# Patient Record
Sex: FEMALE | Race: WHITE | NOT HISPANIC OR LATINO | Employment: FULL TIME | URBAN - METROPOLITAN AREA
[De-identification: names, ages, dates, MRNs, and addresses within clinical notes are randomized per-mention and may not be internally consistent; named-entity substitution may affect disease eponyms.]

---
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
## 2018-11-09 ENCOUNTER — HOSPITAL ENCOUNTER (OUTPATIENT)
Dept: LAB | Facility: MEDICAL CENTER | Age: 18
End: 2018-11-09
Attending: FAMILY MEDICINE
Payer: COMMERCIAL

## 2018-11-09 LAB
ALBUMIN SERPL BCP-MCNC: 4.7 G/DL (ref 3.2–4.9)
ALBUMIN/GLOB SERPL: 2 G/DL
ALP SERPL-CCNC: 68 U/L (ref 30–99)
ALT SERPL-CCNC: 17 U/L (ref 2–50)
AMYLASE SERPL-CCNC: 52 U/L (ref 20–103)
ANION GAP SERPL CALC-SCNC: 6 MMOL/L (ref 0–11.9)
APPEARANCE UR: CLEAR
AST SERPL-CCNC: 9 U/L (ref 12–45)
BACTERIA #/AREA URNS HPF: ABNORMAL /HPF
BASOPHILS # BLD AUTO: 0.7 % (ref 0–1.8)
BASOPHILS # BLD: 0.03 K/UL (ref 0–0.12)
BILIRUB SERPL-MCNC: 0.5 MG/DL (ref 0.1–1.5)
BILIRUB UR QL STRIP.AUTO: NEGATIVE
BUN SERPL-MCNC: 8 MG/DL (ref 8–22)
CALCIUM SERPL-MCNC: 9.7 MG/DL (ref 8.5–10.5)
CHLORIDE SERPL-SCNC: 106 MMOL/L (ref 96–112)
CO2 SERPL-SCNC: 26 MMOL/L (ref 20–33)
COLOR UR: YELLOW
CREAT SERPL-MCNC: 0.86 MG/DL (ref 0.5–1.4)
EOSINOPHIL # BLD AUTO: 0.07 K/UL (ref 0–0.51)
EOSINOPHIL NFR BLD: 1.6 % (ref 0–6.9)
EPI CELLS #/AREA URNS HPF: NEGATIVE /HPF
ERYTHROCYTE [DISTWIDTH] IN BLOOD BY AUTOMATED COUNT: 39.9 FL (ref 35.9–50)
FASTING STATUS PATIENT QL REPORTED: NORMAL
GLOBULIN SER CALC-MCNC: 2.4 G/DL (ref 1.9–3.5)
GLUCOSE SERPL-MCNC: 89 MG/DL (ref 65–99)
GLUCOSE UR STRIP.AUTO-MCNC: NEGATIVE MG/DL
HCT VFR BLD AUTO: 46.2 % (ref 37–47)
HGB BLD-MCNC: 15.3 G/DL (ref 12–16)
HYALINE CASTS #/AREA URNS LPF: ABNORMAL /LPF
IMM GRANULOCYTES # BLD AUTO: 0.01 K/UL (ref 0–0.11)
IMM GRANULOCYTES NFR BLD AUTO: 0.2 % (ref 0–0.9)
KETONES UR STRIP.AUTO-MCNC: NEGATIVE MG/DL
LEUKOCYTE ESTERASE UR QL STRIP.AUTO: ABNORMAL
LIPASE SERPL-CCNC: 16 U/L (ref 11–82)
LYMPHOCYTES # BLD AUTO: 1.44 K/UL (ref 1–4.8)
LYMPHOCYTES NFR BLD: 33.3 % (ref 22–41)
MCH RBC QN AUTO: 30.1 PG (ref 27–33)
MCHC RBC AUTO-ENTMCNC: 33.1 G/DL (ref 33.6–35)
MCV RBC AUTO: 90.9 FL (ref 81.4–97.8)
MICRO URNS: ABNORMAL
MONOCYTES # BLD AUTO: 0.39 K/UL (ref 0–0.85)
MONOCYTES NFR BLD AUTO: 9 % (ref 0–13.4)
NEUTROPHILS # BLD AUTO: 2.38 K/UL (ref 2–7.15)
NEUTROPHILS NFR BLD: 55.2 % (ref 44–72)
NITRITE UR QL STRIP.AUTO: NEGATIVE
NRBC # BLD AUTO: 0 K/UL
NRBC BLD-RTO: 0 /100 WBC
PH UR STRIP.AUTO: 7 [PH]
PLATELET # BLD AUTO: 172 K/UL (ref 164–446)
PMV BLD AUTO: 13 FL (ref 9–12.9)
POTASSIUM SERPL-SCNC: 3.8 MMOL/L (ref 3.6–5.5)
PROT SERPL-MCNC: 7.1 G/DL (ref 6–8.2)
PROT UR QL STRIP: NEGATIVE MG/DL
RBC # BLD AUTO: 5.08 M/UL (ref 4.2–5.4)
RBC # URNS HPF: ABNORMAL /HPF
RBC UR QL AUTO: NEGATIVE
SODIUM SERPL-SCNC: 138 MMOL/L (ref 135–145)
SP GR UR STRIP.AUTO: 1.01
UROBILINOGEN UR STRIP.AUTO-MCNC: 0.2 MG/DL
WBC # BLD AUTO: 4.3 K/UL (ref 4.8–10.8)
WBC #/AREA URNS HPF: ABNORMAL /HPF

## 2018-11-09 PROCEDURE — 36415 COLL VENOUS BLD VENIPUNCTURE: CPT

## 2018-11-09 PROCEDURE — 81001 URINALYSIS AUTO W/SCOPE: CPT

## 2018-11-09 PROCEDURE — 80053 COMPREHEN METABOLIC PANEL: CPT

## 2018-11-09 PROCEDURE — 85025 COMPLETE CBC W/AUTO DIFF WBC: CPT

## 2018-11-09 PROCEDURE — 83690 ASSAY OF LIPASE: CPT

## 2018-11-09 PROCEDURE — 82150 ASSAY OF AMYLASE: CPT

## 2019-03-07 ENCOUNTER — OFFICE VISIT (OUTPATIENT)
Dept: URGENT CARE | Facility: PHYSICIAN GROUP | Age: 19
End: 2019-03-07
Payer: COMMERCIAL

## 2019-03-07 VITALS
HEART RATE: 80 BPM | BODY MASS INDEX: 26.11 KG/M2 | TEMPERATURE: 99 F | HEIGHT: 60 IN | DIASTOLIC BLOOD PRESSURE: 62 MMHG | RESPIRATION RATE: 18 BRPM | OXYGEN SATURATION: 97 % | SYSTOLIC BLOOD PRESSURE: 112 MMHG | WEIGHT: 133 LBS

## 2019-03-07 DIAGNOSIS — H61.21 IMPACTED CERUMEN OF RIGHT EAR: ICD-10-CM

## 2019-03-07 DIAGNOSIS — H66.001 ACUTE SUPPURATIVE OTITIS MEDIA OF RIGHT EAR WITHOUT SPONTANEOUS RUPTURE OF TYMPANIC MEMBRANE, RECURRENCE NOT SPECIFIED: ICD-10-CM

## 2019-03-07 PROCEDURE — 69210 REMOVE IMPACTED EAR WAX UNI: CPT | Performed by: PHYSICIAN ASSISTANT

## 2019-03-07 PROCEDURE — 99214 OFFICE O/P EST MOD 30 MIN: CPT | Mod: 25 | Performed by: PHYSICIAN ASSISTANT

## 2019-03-07 RX ORDER — AMOXICILLIN 875 MG/1
875 TABLET, COATED ORAL 2 TIMES DAILY
Qty: 20 TAB | Refills: 0 | Status: SHIPPED | OUTPATIENT
Start: 2019-03-07 | End: 2019-03-17

## 2019-03-10 ASSESSMENT — ENCOUNTER SYMPTOMS
COUGH: 0
CHILLS: 0
HEADACHES: 0
EYE REDNESS: 0
EYE DISCHARGE: 0
VOMITING: 0
RHINORRHEA: 0
FEVER: 0
DIARRHEA: 0

## 2019-03-10 NOTE — PROGRESS NOTES
Subjective:      Homa Rodriguez is a 18 y.o. female who presents with Otalgia (right ear yesterday )            Patient is a 18-year-old female who presents to urgent care with right ear pain since yesterday.  Patient reports history of ear infections in the past and is uncertain if she has one.  Reports that she does have decreased hearing however has had issues with wax in the past as well.      Otalgia    There is pain in the right ear. This is a new problem. The current episode started yesterday. The problem occurs constantly. Associated symptoms include hearing loss. Pertinent negatives include no coughing, diarrhea, ear discharge, headaches, rash, rhinorrhea or vomiting. She has tried nothing for the symptoms.       Review of Systems   Constitutional: Negative for chills and fever.   HENT: Positive for ear pain and hearing loss. Negative for ear discharge and rhinorrhea.    Eyes: Negative for discharge and redness.   Respiratory: Negative for cough.    Gastrointestinal: Negative for diarrhea and vomiting.   Skin: Negative for rash.   Neurological: Negative for headaches.   All other systems reviewed and are negative.         Objective:     /62   Pulse 80   Temp 37.2 °C (99 °F) (Temporal)   Resp 18   Ht 1.524 m (5')   Wt 60.3 kg (133 lb)   SpO2 97%   BMI 25.97 kg/m²    PMH:  has no past medical history on file.  MEDS:   Current Outpatient Prescriptions:   •  amoxicillin (AMOXIL) 875 MG tablet, Take 1 Tab by mouth 2 times a day for 10 days., Disp: 20 Tab, Rfl: 0  •  nitrofurantoin monohydr macro (MACROBID) 100 MG Cap, Take 1 Cap by mouth 2 times a day. (Patient not taking: Reported on 3/7/2019), Disp: 20 Cap, Rfl: 0  •  amoxicillin (AMOXIL) 500 MG Cap, Take 1 Cap by mouth 3 times a day. (Patient not taking: Reported on 3/7/2019), Disp: 30 Cap, Rfl: 0  •  Probiotic Product (PROBIOTIC DAILY PO), Take  by mouth., Disp: , Rfl:   •  cephALEXin (KEFLEX) 500 MG Cap, Take 1 Cap by mouth 2 times a  day. (Patient not taking: Reported on 3/7/2019), Disp: 20 Cap, Rfl: 0  •  Multiple Vitamin (DAILY VITAMIN PO), Take  by mouth., Disp: , Rfl:   ALLERGIES: No Known Allergies  SURGHX: No past surgical history on file.  SOCHX:  reports that she has never smoked. She has never used smokeless tobacco.  FH: Family history was reviewed, no pertinent findings to report      Physical Exam   Constitutional: She is oriented to person, place, and time. She appears well-developed and well-nourished. No distress.   HENT:   Head: Normocephalic and atraumatic.   Left Ear: External ear normal.   Mouth/Throat: Oropharynx is clear and moist. No oropharyngeal exudate.   Right canal with notable cerumen impaction-this was removed by myself today with curette.  Canal and TM with slight erythema without bulging -with slight clear middle ear effusion.   Eyes: Pupils are equal, round, and reactive to light. Conjunctivae and EOM are normal.   Neck: Normal range of motion. Neck supple. No tracheal deviation present.   Cardiovascular: Normal rate and regular rhythm.    No murmur heard.  Pulmonary/Chest: Effort normal and breath sounds normal. No respiratory distress.   Musculoskeletal: Normal range of motion. She exhibits no edema.   Neurological: She is alert and oriented to person, place, and time. Coordination normal.   Skin: Skin is warm. No rash noted.   Psychiatric: She has a normal mood and affect. Her behavior is normal. Judgment and thought content normal.   Vitals reviewed.              Assessment/Plan:     1. Impacted cerumen of right ear  2. Acute suppurative otitis media of right ear without spontaneous rupture of tympanic membrane, recurrence not specified  - amoxicillin (AMOXIL) 875 MG tablet; Take 1 Tab by mouth 2 times a day for 10 days.  Dispense: 20 Tab; Refill: 0    TM and canal with slight erythema-contingent antibiotic was written for this patient to start based on guidelines discussed during the visit today.  Avoid the  use of Q-tips in the future.  Return to clinic as needed.  Patient given precautionary s/sx that mandate immediate follow up and evaluation in the ED. Advised of risks of not doing so.    DDX, Supportive care, and indications for immediate follow-up discussed with patient.    Instructed to return to clinic or nearest emergency department if we are not available for any change in condition, further concerns, or worsening of symptoms.    The patient demonstrated a good understanding and agreed with the treatment plan.  Please note that this dictation was created using voice recognition software. I have made every reasonable attempt to correct obvious errors, but I expect that there are errors of grammar and possibly content that I did not discover before finalizing the note.

## 2019-07-24 ENCOUNTER — HOSPITAL ENCOUNTER (OUTPATIENT)
Dept: RADIOLOGY | Facility: MEDICAL CENTER | Age: 19
End: 2019-07-24
Attending: PHYSICIAN ASSISTANT
Payer: COMMERCIAL

## 2019-07-24 ENCOUNTER — OFFICE VISIT (OUTPATIENT)
Dept: URGENT CARE | Facility: PHYSICIAN GROUP | Age: 19
End: 2019-07-24
Payer: COMMERCIAL

## 2019-07-24 VITALS
HEART RATE: 87 BPM | TEMPERATURE: 97.7 F | HEIGHT: 60 IN | SYSTOLIC BLOOD PRESSURE: 122 MMHG | BODY MASS INDEX: 27.96 KG/M2 | DIASTOLIC BLOOD PRESSURE: 70 MMHG | OXYGEN SATURATION: 97 % | WEIGHT: 142.4 LBS

## 2019-07-24 DIAGNOSIS — R10.9 LEFT SIDED ABDOMINAL PAIN: ICD-10-CM

## 2019-07-24 LAB
APPEARANCE UR: NORMAL
BILIRUB UR STRIP-MCNC: NEGATIVE MG/DL
COLOR UR AUTO: NORMAL
GLUCOSE UR STRIP.AUTO-MCNC: NEGATIVE MG/DL
INT CON NEG: NEGATIVE
INT CON POS: POSITIVE
KETONES UR STRIP.AUTO-MCNC: NEGATIVE MG/DL
LEUKOCYTE ESTERASE UR QL STRIP.AUTO: NEGATIVE
NITRITE UR QL STRIP.AUTO: NEGATIVE
PH UR STRIP.AUTO: 7 [PH] (ref 5–8)
POC URINE PREGNANCY TEST: NEGATIVE
PROT UR QL STRIP: NEGATIVE MG/DL
RBC UR QL AUTO: NEGATIVE
SP GR UR STRIP.AUTO: 1.02
UROBILINOGEN UR STRIP-MCNC: 0.2 MG/DL

## 2019-07-24 PROCEDURE — 99213 OFFICE O/P EST LOW 20 MIN: CPT | Performed by: PHYSICIAN ASSISTANT

## 2019-07-24 PROCEDURE — 76830 TRANSVAGINAL US NON-OB: CPT

## 2019-07-24 PROCEDURE — 81002 URINALYSIS NONAUTO W/O SCOPE: CPT | Performed by: PHYSICIAN ASSISTANT

## 2019-07-24 PROCEDURE — 81025 URINE PREGNANCY TEST: CPT | Performed by: PHYSICIAN ASSISTANT

## 2019-07-24 RX ORDER — DESOGESTREL AND ETHINYL ESTRADIOL 0.15-0.03
1 KIT ORAL
Refills: 3 | COMMUNITY
Start: 2019-07-17

## 2019-07-24 NOTE — PROGRESS NOTES
Chief Complaint   Patient presents with   • Side Pain     L side pain, pressure, x1 day        HISTORY OF PRESENT ILLNESS: Patient is a 18 y.o. female who presents today for 1 day of worsening left mid abdominal pain.  Pain does not radiate.  Not worsened by eating, not alleviated by bowel movements, is constant.  She notes it feels like a pressure.   She is taking Tylenol/Ibuprofen but do not help.  She has tried ice and heat and ice does given some relief.  No urinary symptoms or vaginal symptoms.  No new work outs or strain of the abdomen she can recall. Hx of IBS but does not feel the same.  No bloody stools, no nausea or vomiting. No recent illness.   No hx of ovarian cysts.   Compliant on birth control daily and has alarm for it.      There are no active problems to display for this patient.      Allergies:Patient has no known allergies.    Current Outpatient Prescriptions Ordered in [facility]   Medication Sig Dispense Refill   • ENSKYCE 0.15-30 MG-MCG per tablet Take 1 Tab by mouth every day.  3   • Probiotic Product (PROBIOTIC DAILY PO) Take  by mouth.     • Multiple Vitamin (DAILY VITAMIN PO) Take  by mouth.       No current Epic-ordered facility-administered medications on file.        No past medical history on file.    Social History   Substance Use Topics   • Smoking status: Never Smoker   • Smokeless tobacco: Never Used   • Alcohol use Not on file       No family status information on file.   No family history on file.    ROS:  Review of Systems   Constitutional: Negative for fever, chills, weight loss and malaise/fatigue.   HENT: Negative for ear pain, nosebleeds, congestion, sore throat and neck pain.    Eyes: Negative for blurred vision.   Respiratory: Negative for cough, sputum production, shortness of breath and wheezing.    Cardiovascular: Negative for chest pain, palpitations, orthopnea and leg swelling.   Gastrointestinal: SEE HPI  Genitourinary: SEE HPI    Exam:  /70 (BP Location: Left arm,  Patient Position: Sitting, BP Cuff Size: Adult)   Pulse 87   Temp 36.5 °C (97.7 °F) (Temporal)   Ht 1.524 m (5')   Wt 64.6 kg (142 lb 6.4 oz)   SpO2 97%   General:  Well nourished, well developed female in NAD  Eyes: PERRLA, EOM within normal limits, no conjunctival injection, no scleral icterus, visual fields and acuity grossly intact.   Mouth: reasonable hygiene, no erythema exudates or tonsillar enlargement.  Neck: no masses, range of motion within normal limits, no tracheal deviation. No lymphadenopathy  Pulmonary: Normal respiratory effort, no wheezes, crackles, or rhonchi.  Cardiovascular: regular rate and rhythm without murmurs, rubs, or gallops.  Abdomen: Soft,  Nondistended.  TTP to deep palpation left mid abdomen.  Normal bowel sounds. No hepatosplenomegaly or masses, or hernias. No rebound or guarding. No CVA tenderness.   Skin: No visible rashes or lesion. Warm, pink, dry.   Extremities: no clubbing, cyanosis, or edema.  Neuro: A&O x 3. Speech normal/clear.  Normal gait.         Component Results     Component Value Ref Range & Units Status   POC Color light yellow  Negative Final   POC Appearance slightly cloudy  Negative Final   POC Leukocyte Esterase negative  Negative Final   POC Nitrites negative  Negative Final   POC Urobiligen 0.2  Negative (0.2) mg/dL Final   POC Protein negative  Negative mg/dL Final   POC Urine PH 7.0  5.0 - 8.0 Final   POC Blood negative  Negative Final   POC Specific Gravity 1.020  <1.005 - >1.030 Final   POC Ketones negative  Negative mg/dL Final   POC Bilirubin negative  Negative mg/dL Final   POC Glucose negative  Negative mg/dL [place]     Impression       Normal transvaginal appearance of the pelvis.   Reading Provider Reading Date   Jeremías Ortiz M.D. Jul 24, 2019       Assessment/Plan:  1. Left sided abdominal pain  POCT Urinalysis    POCT Pregnancy    US-PELVIC COMPLETE (TRANSABDOMINAL/TRANSVAGINAL) (COMBO)         -U/S and U/A unremarkable.   -ROS/PE overall  benign.   Discussed monitoring, Ibuprofen/Tylenol, heating pad, stretches and follow up.  Encouraged good hydration.   -RTC/PCP follow up if not improving, ER precautions for severe or acutely worsening pain.         Supportive care, differential diagnoses, and indications for immediate follow-up discussed with patient.   Pathogenesis of diagnosis discussed including typical length and natural progression.   Instructed to return to clinic or nearest emergency department for any change in condition, further concerns, or worsening of symptoms.  Patient states understanding of the plan of care and discharge instructions.        Anu Ochoa P.A.-C.

## 2021-03-08 ENCOUNTER — OFFICE VISIT (OUTPATIENT)
Dept: URGENT CARE | Facility: PHYSICIAN GROUP | Age: 21
End: 2021-03-08
Payer: COMMERCIAL

## 2021-03-08 VITALS
DIASTOLIC BLOOD PRESSURE: 76 MMHG | HEIGHT: 60 IN | HEART RATE: 85 BPM | BODY MASS INDEX: 32.59 KG/M2 | WEIGHT: 166 LBS | RESPIRATION RATE: 14 BRPM | OXYGEN SATURATION: 95 % | SYSTOLIC BLOOD PRESSURE: 120 MMHG | TEMPERATURE: 97.8 F

## 2021-03-08 DIAGNOSIS — H61.22 EXCESSIVE CERUMEN IN LEFT EAR CANAL: ICD-10-CM

## 2021-03-08 PROCEDURE — 99213 OFFICE O/P EST LOW 20 MIN: CPT | Performed by: FAMILY MEDICINE

## 2021-03-09 NOTE — PROGRESS NOTES
Subjective:      Homa Rodriguez is a 20 y.o. female who presents with Ear Pain (L ear discomfort, feels like it needs to pop, started this morning )    - This is a pleasant and nontoxic appearing 20 y.o. female with c/o Lt ear pain x 1 day. Feels more like pressure and blocked. No trauma or NVFC            ALLERGIES:  Patient has no known allergies.     PMH:  History reviewed. No pertinent past medical history.     PSH:  History reviewed. No pertinent surgical history.    MEDS:    Current Outpatient Medications:   •  ENSKYCE 0.15-30 MG-MCG per tablet, Take 1 Tab by mouth every day., Disp: , Rfl: 3  •  Probiotic Product (PROBIOTIC DAILY PO), Take  by mouth., Disp: , Rfl:   •  Multiple Vitamin (DAILY VITAMIN PO), Take  by mouth., Disp: , Rfl:     ** I have documented what I find to be significant in regards to past medical, social, family and surgical history  in my HPI or under PMH/PSH/FH review section, otherwise it is noncontributory **             HPI    Review of Systems   HENT: Positive for ear pain.    All other systems reviewed and are negative.         Objective:     /76 (BP Location: Left arm, Patient Position: Sitting, BP Cuff Size: Adult)   Pulse 85   Temp 36.6 °C (97.8 °F) (Temporal)   Resp 14   Ht 1.524 m (5')   Wt 75.3 kg (166 lb)   SpO2 95%   BMI 32.42 kg/m²      Physical Exam  Vitals and nursing note reviewed.   Constitutional:       General: She is not in acute distress.     Appearance: She is well-developed. She is not diaphoretic.   HENT:      Head: Normocephalic and atraumatic.      Right Ear: Tympanic membrane, ear canal and external ear normal.      Left Ear: External ear normal. There is impacted cerumen.   Eyes:      General: No scleral icterus.     Conjunctiva/sclera: Conjunctivae normal.   Cardiovascular:      Heart sounds: Normal heart sounds. No murmur.   Pulmonary:      Effort: Pulmonary effort is normal. No respiratory distress.      Breath sounds: Normal breath  sounds.   Skin:     Coloration: Skin is not pale.      Findings: No rash.   Neurological:      Mental Status: She is alert.      Motor: No abnormal muscle tone.   Psychiatric:         Mood and Affect: Mood normal.         Behavior: Behavior normal.         Judgment: Judgment normal.                 Assessment/Plan:            1. Excessive cerumen in left ear canal           - Dx, plan & d/c instructions discussed w/ patient and/or family members  - Rest, stay hydrated OTC Motrin and/or Tylenol as needed  - E.R. precautions discussed       Follow up with their PCP in 2-3 days strongly advised, ER if not improving or feeling/getting worse.    Any realistic side effects of medications that may have been given today (i.e. Rash, GI upset/constipation, sedation, elevation of BP or blood sugars) discussed.     Patient left in stable condition

## 2021-03-24 ENCOUNTER — HOSPITAL ENCOUNTER (OUTPATIENT)
Dept: LAB | Facility: MEDICAL CENTER | Age: 21
End: 2021-03-24
Attending: INTERNAL MEDICINE
Payer: COMMERCIAL

## 2021-03-24 LAB
ALBUMIN SERPL BCP-MCNC: 4.2 G/DL (ref 3.2–4.9)
ALBUMIN/GLOB SERPL: 1.3 G/DL
ALP SERPL-CCNC: 58 U/L (ref 30–99)
ALT SERPL-CCNC: 20 U/L (ref 2–50)
ANION GAP SERPL CALC-SCNC: 10 MMOL/L (ref 7–16)
AST SERPL-CCNC: 8 U/L (ref 12–45)
BASOPHILS # BLD AUTO: 0.4 % (ref 0–1.8)
BASOPHILS # BLD: 0.02 K/UL (ref 0–0.12)
BILIRUB SERPL-MCNC: 0.2 MG/DL (ref 0.1–1.5)
BUN SERPL-MCNC: 7 MG/DL (ref 8–22)
CALCIUM SERPL-MCNC: 9.2 MG/DL (ref 8.5–10.5)
CHLORIDE SERPL-SCNC: 104 MMOL/L (ref 96–112)
CO2 SERPL-SCNC: 24 MMOL/L (ref 20–33)
CREAT SERPL-MCNC: 0.78 MG/DL (ref 0.5–1.4)
EOSINOPHIL # BLD AUTO: 0.08 K/UL (ref 0–0.51)
EOSINOPHIL NFR BLD: 1.5 % (ref 0–6.9)
ERYTHROCYTE [DISTWIDTH] IN BLOOD BY AUTOMATED COUNT: 42.3 FL (ref 35.9–50)
GLOBULIN SER CALC-MCNC: 3.2 G/DL (ref 1.9–3.5)
GLUCOSE SERPL-MCNC: 82 MG/DL (ref 65–99)
HCT VFR BLD AUTO: 47.4 % (ref 37–47)
HGB BLD-MCNC: 15.3 G/DL (ref 12–16)
IMM GRANULOCYTES # BLD AUTO: 0.01 K/UL (ref 0–0.11)
IMM GRANULOCYTES NFR BLD AUTO: 0.2 % (ref 0–0.9)
LYMPHOCYTES # BLD AUTO: 2.24 K/UL (ref 1–4.8)
LYMPHOCYTES NFR BLD: 40.7 % (ref 22–41)
MCH RBC QN AUTO: 29.5 PG (ref 27–33)
MCHC RBC AUTO-ENTMCNC: 32.3 G/DL (ref 33.6–35)
MCV RBC AUTO: 91.3 FL (ref 81.4–97.8)
MONOCYTES # BLD AUTO: 0.44 K/UL (ref 0–0.85)
MONOCYTES NFR BLD AUTO: 8 % (ref 0–13.4)
NEUTROPHILS # BLD AUTO: 2.72 K/UL (ref 2–7.15)
NEUTROPHILS NFR BLD: 49.2 % (ref 44–72)
NRBC # BLD AUTO: 0 K/UL
NRBC BLD-RTO: 0 /100 WBC
PLATELET # BLD AUTO: 206 K/UL (ref 164–446)
PMV BLD AUTO: 13.3 FL (ref 9–12.9)
POTASSIUM SERPL-SCNC: 3.8 MMOL/L (ref 3.6–5.5)
PROT SERPL-MCNC: 7.4 G/DL (ref 6–8.2)
RBC # BLD AUTO: 5.19 M/UL (ref 4.2–5.4)
SODIUM SERPL-SCNC: 138 MMOL/L (ref 135–145)
WBC # BLD AUTO: 5.5 K/UL (ref 4.8–10.8)

## 2021-03-24 PROCEDURE — 84443 ASSAY THYROID STIM HORMONE: CPT

## 2021-03-24 PROCEDURE — 36415 COLL VENOUS BLD VENIPUNCTURE: CPT

## 2021-03-24 PROCEDURE — 83516 IMMUNOASSAY NONANTIBODY: CPT

## 2021-03-24 PROCEDURE — 80053 COMPREHEN METABOLIC PANEL: CPT

## 2021-03-24 PROCEDURE — 82784 ASSAY IGA/IGD/IGG/IGM EACH: CPT

## 2021-03-24 PROCEDURE — 85025 COMPLETE CBC W/AUTO DIFF WBC: CPT

## 2021-03-25 LAB — TSH SERPL DL<=0.005 MIU/L-ACNC: 1.98 UIU/ML (ref 0.38–5.33)

## 2021-03-26 LAB
IGA SERPL-MCNC: 129 MG/DL (ref 68–408)
TTG IGA SER IA-ACNC: <2 U/ML (ref 0–3)

## 2021-09-04 ENCOUNTER — NON-PROVIDER VISIT (OUTPATIENT)
Dept: URGENT CARE | Facility: PHYSICIAN GROUP | Age: 21
End: 2021-09-04

## 2021-09-04 DIAGNOSIS — Z02.1 PRE-EMPLOYMENT DRUG SCREENING: ICD-10-CM

## 2021-09-04 LAB
AMP AMPHETAMINE: NORMAL
COC COCAINE: NORMAL
INT CON NEG: NORMAL
INT CON POS: NORMAL
MET METHAMPHETAMINES: NORMAL
OPI OPIATES: NORMAL
PCP PHENCYCLIDINE: NORMAL
POC DRUG COMMENT 753798-OCCUPATIONAL HEALTH: NORMAL
THC: NORMAL

## 2021-09-04 PROCEDURE — 80305 DRUG TEST PRSMV DIR OPT OBS: CPT | Performed by: PHYSICIAN ASSISTANT

## 2021-11-11 ENCOUNTER — HOSPITAL ENCOUNTER (OUTPATIENT)
Dept: RADIOLOGY | Facility: MEDICAL CENTER | Age: 21
End: 2021-11-11
Attending: FAMILY MEDICINE
Payer: COMMERCIAL

## 2021-11-11 DIAGNOSIS — R10.32 ABDOMINAL PAIN, LEFT LOWER QUADRANT: ICD-10-CM

## 2021-11-11 DIAGNOSIS — R10.31 ABDOMINAL PAIN, RIGHT LOWER QUADRANT: ICD-10-CM

## 2021-11-11 PROCEDURE — 76857 US EXAM PELVIC LIMITED: CPT

## 2023-03-09 ENCOUNTER — HOSPITAL ENCOUNTER (OUTPATIENT)
Dept: RADIOLOGY | Facility: MEDICAL CENTER | Age: 23
End: 2023-03-09
Attending: FAMILY MEDICINE
Payer: COMMERCIAL

## 2023-03-09 DIAGNOSIS — R10.31 RIGHT LOWER QUADRANT ABDOMINAL PAIN: ICD-10-CM

## 2023-03-09 PROCEDURE — 700117 HCHG RX CONTRAST REV CODE 255: Performed by: FAMILY MEDICINE

## 2023-03-09 PROCEDURE — 74177 CT ABD & PELVIS W/CONTRAST: CPT

## 2023-03-09 RX ADMIN — IOHEXOL 100 ML: 350 INJECTION, SOLUTION INTRAVENOUS at 17:25

## 2025-01-20 ENCOUNTER — HOSPITAL ENCOUNTER (OUTPATIENT)
Facility: MEDICAL CENTER | Age: 25
End: 2025-01-20
Attending: OBSTETRICS & GYNECOLOGY
Payer: COMMERCIAL

## 2025-01-20 LAB
BLD GP AB SCN SERPL QL: ABNORMAL
RH BLD: NORMAL

## 2025-01-20 PROCEDURE — 86850 RBC ANTIBODY SCREEN: CPT

## 2025-01-20 PROCEDURE — 86870 RBC ANTIBODY IDENTIFICATION: CPT | Mod: 91

## 2025-01-20 PROCEDURE — 86901 BLOOD TYPING SEROLOGIC RH(D): CPT

## 2025-01-21 ENCOUNTER — HOSPITAL ENCOUNTER (OUTPATIENT)
Dept: LAB | Facility: MEDICAL CENTER | Age: 25
End: 2025-01-21
Attending: OBSTETRICS & GYNECOLOGY
Payer: COMMERCIAL

## 2025-01-21 PROCEDURE — 86906 BLD TYPING SEROLOGIC RH PHNT: CPT

## 2025-01-21 PROCEDURE — 86978 RBC PRETREATMENT SERUM: CPT

## 2025-01-21 PROCEDURE — 86880 COOMBS TEST DIRECT: CPT | Mod: 91

## 2025-01-21 PROCEDURE — 86901 BLOOD TYPING SEROLOGIC RH(D): CPT

## 2025-01-21 PROCEDURE — 36415 COLL VENOUS BLD VENIPUNCTURE: CPT

## 2025-01-21 PROCEDURE — 86900 BLOOD TYPING SEROLOGIC ABO: CPT

## 2025-01-21 PROCEDURE — 86850 RBC ANTIBODY SCREEN: CPT

## 2025-01-21 PROCEDURE — 86902 BLOOD TYPE ANTIGEN DONOR EA: CPT

## 2025-01-21 PROCEDURE — 86870 RBC ANTIBODY IDENTIFICATION: CPT | Mod: 91

## 2025-01-21 PROCEDURE — 86860 RBC ANTIBODY ELUTION: CPT

## 2025-01-21 PROCEDURE — 86970 RBC PRETX INCUBATJ W/CHEMICL: CPT

## 2025-01-30 LAB — BLD GP AB INVEST PLASRBC-IMP: ABNORMAL

## 2025-01-30 PROCEDURE — 86970 RBC PRETX INCUBATJ W/CHEMICL: CPT

## 2025-01-30 PROCEDURE — 86906 BLD TYPING SEROLOGIC RH PHNT: CPT

## 2025-01-30 PROCEDURE — 86880 COOMBS TEST DIRECT: CPT | Mod: 91

## 2025-01-30 PROCEDURE — 86850 RBC ANTIBODY SCREEN: CPT

## 2025-01-30 PROCEDURE — 86978 RBC PRETREATMENT SERUM: CPT

## 2025-01-30 PROCEDURE — 86900 BLOOD TYPING SEROLOGIC ABO: CPT

## 2025-01-30 PROCEDURE — 86901 BLOOD TYPING SEROLOGIC RH(D): CPT

## 2025-01-30 PROCEDURE — 86870 RBC ANTIBODY IDENTIFICATION: CPT

## 2025-01-30 PROCEDURE — 86860 RBC ANTIBODY ELUTION: CPT

## 2025-01-30 PROCEDURE — 86902 BLOOD TYPE ANTIGEN DONOR EA: CPT

## 2025-03-14 ENCOUNTER — APPOINTMENT (OUTPATIENT)
Dept: OBGYN | Facility: MEDICAL CENTER | Age: 25
End: 2025-03-14
Attending: OBSTETRICS & GYNECOLOGY
Payer: COMMERCIAL

## 2025-03-14 ENCOUNTER — HOSPITAL ENCOUNTER (INPATIENT)
Facility: MEDICAL CENTER | Age: 25
LOS: 4 days | End: 2025-03-18
Attending: OBSTETRICS & GYNECOLOGY | Admitting: OBSTETRICS & GYNECOLOGY
Payer: COMMERCIAL

## 2025-03-14 LAB
BASOPHILS # BLD AUTO: 0.3 % (ref 0–1.8)
BASOPHILS # BLD: 0.03 K/UL (ref 0–0.12)
EOSINOPHIL # BLD AUTO: 0.05 K/UL (ref 0–0.51)
EOSINOPHIL NFR BLD: 0.4 % (ref 0–6.9)
ERYTHROCYTE [DISTWIDTH] IN BLOOD BY AUTOMATED COUNT: 42.6 FL (ref 35.9–50)
GLUCOSE BLD STRIP.AUTO-MCNC: 92 MG/DL (ref 65–99)
HCT VFR BLD AUTO: 41.8 % (ref 37–47)
HGB BLD-MCNC: 13.6 G/DL (ref 12–16)
HOLDING TUBE BB 8507: NORMAL
IMM GRANULOCYTES # BLD AUTO: 0.05 K/UL (ref 0–0.11)
IMM GRANULOCYTES NFR BLD AUTO: 0.4 % (ref 0–0.9)
LYMPHOCYTES # BLD AUTO: 2.29 K/UL (ref 1–4.8)
LYMPHOCYTES NFR BLD: 20.3 % (ref 22–41)
MCH RBC QN AUTO: 28.7 PG (ref 27–33)
MCHC RBC AUTO-ENTMCNC: 32.5 G/DL (ref 32.2–35.5)
MCV RBC AUTO: 88.2 FL (ref 81.4–97.8)
MONOCYTES # BLD AUTO: 0.86 K/UL (ref 0–0.85)
MONOCYTES NFR BLD AUTO: 7.6 % (ref 0–13.4)
NEUTROPHILS # BLD AUTO: 8.01 K/UL (ref 1.82–7.42)
NEUTROPHILS NFR BLD: 71 % (ref 44–72)
NRBC # BLD AUTO: 0 K/UL
NRBC BLD-RTO: 0 /100 WBC (ref 0–0.2)
PLATELET # BLD AUTO: 149 K/UL (ref 164–446)
PMV BLD AUTO: 12.1 FL (ref 9–12.9)
RBC # BLD AUTO: 4.74 M/UL (ref 4.2–5.4)
T PALLIDUM AB SER QL IA: NORMAL
WBC # BLD AUTO: 11.3 K/UL (ref 4.8–10.8)

## 2025-03-14 PROCEDURE — 85025 COMPLETE CBC W/AUTO DIFF WBC: CPT

## 2025-03-14 PROCEDURE — A9270 NON-COVERED ITEM OR SERVICE: HCPCS | Performed by: SPECIALIST

## 2025-03-14 PROCEDURE — 86780 TREPONEMA PALLIDUM: CPT

## 2025-03-14 PROCEDURE — 770002 HCHG ROOM/CARE - OB PRIVATE (112)

## 2025-03-14 PROCEDURE — 36415 COLL VENOUS BLD VENIPUNCTURE: CPT

## 2025-03-14 PROCEDURE — 3E0P7VZ INTRODUCTION OF HORMONE INTO FEMALE REPRODUCTIVE, VIA NATURAL OR ARTIFICIAL OPENING: ICD-10-PCS | Performed by: SPECIALIST

## 2025-03-14 PROCEDURE — 700102 HCHG RX REV CODE 250 W/ 637 OVERRIDE(OP): Performed by: SPECIALIST

## 2025-03-14 PROCEDURE — 700105 HCHG RX REV CODE 258: Performed by: SPECIALIST

## 2025-03-14 PROCEDURE — 82962 GLUCOSE BLOOD TEST: CPT

## 2025-03-14 RX ORDER — IBUPROFEN 800 MG/1
800 TABLET, FILM COATED ORAL
Status: COMPLETED | OUTPATIENT
Start: 2025-03-14 | End: 2025-03-16

## 2025-03-14 RX ORDER — TERBUTALINE SULFATE 1 MG/ML
0.25 INJECTION SUBCUTANEOUS
Status: DISCONTINUED | OUTPATIENT
Start: 2025-03-14 | End: 2025-03-16 | Stop reason: HOSPADM

## 2025-03-14 RX ORDER — ONDANSETRON 4 MG/1
4 TABLET, ORALLY DISINTEGRATING ORAL EVERY 6 HOURS PRN
Status: DISCONTINUED | OUTPATIENT
Start: 2025-03-14 | End: 2025-03-16 | Stop reason: HOSPADM

## 2025-03-14 RX ORDER — OXYTOCIN 10 [USP'U]/ML
10 INJECTION, SOLUTION INTRAMUSCULAR; INTRAVENOUS
Status: DISCONTINUED | OUTPATIENT
Start: 2025-03-14 | End: 2025-03-16 | Stop reason: HOSPADM

## 2025-03-14 RX ORDER — METHYLERGONOVINE MALEATE 0.2 MG/ML
0.2 INJECTION INTRAVENOUS
Status: DISCONTINUED | OUTPATIENT
Start: 2025-03-14 | End: 2025-03-16 | Stop reason: HOSPADM

## 2025-03-14 RX ORDER — ONDANSETRON 2 MG/ML
4 INJECTION INTRAMUSCULAR; INTRAVENOUS EVERY 6 HOURS PRN
Status: DISCONTINUED | OUTPATIENT
Start: 2025-03-14 | End: 2025-03-16 | Stop reason: HOSPADM

## 2025-03-14 RX ORDER — SODIUM CHLORIDE, SODIUM LACTATE, POTASSIUM CHLORIDE, CALCIUM CHLORIDE 600; 310; 30; 20 MG/100ML; MG/100ML; MG/100ML; MG/100ML
INJECTION, SOLUTION INTRAVENOUS CONTINUOUS
Status: DISCONTINUED | OUTPATIENT
Start: 2025-03-14 | End: 2025-03-18 | Stop reason: HOSPADM

## 2025-03-14 RX ORDER — MISOPROSTOL 200 UG/1
800 TABLET ORAL
Status: COMPLETED | OUTPATIENT
Start: 2025-03-14 | End: 2025-03-16

## 2025-03-14 RX ORDER — LIDOCAINE HYDROCHLORIDE 10 MG/ML
20 INJECTION, SOLUTION INFILTRATION; PERINEURAL
Status: DISCONTINUED | OUTPATIENT
Start: 2025-03-14 | End: 2025-03-16 | Stop reason: HOSPADM

## 2025-03-14 RX ORDER — ACETAMINOPHEN 500 MG
1000 TABLET ORAL
Status: DISCONTINUED | OUTPATIENT
Start: 2025-03-14 | End: 2025-03-16 | Stop reason: HOSPADM

## 2025-03-14 RX ADMIN — SODIUM CHLORIDE, POTASSIUM CHLORIDE, SODIUM LACTATE AND CALCIUM CHLORIDE: 600; 310; 30; 20 INJECTION, SOLUTION INTRAVENOUS at 23:19

## 2025-03-14 RX ADMIN — DINOPROSTONE 10 MG: 10 INSERT VAGINAL at 21:39

## 2025-03-14 SDOH — ECONOMIC STABILITY: TRANSPORTATION INSECURITY
IN THE PAST 12 MONTHS, HAS THE LACK OF TRANSPORTATION KEPT YOU FROM MEDICAL APPOINTMENTS OR FROM GETTING MEDICATIONS?: PATIENT DECLINED

## 2025-03-14 SDOH — ECONOMIC STABILITY: TRANSPORTATION INSECURITY
IN THE PAST 12 MONTHS, HAS LACK OF RELIABLE TRANSPORTATION KEPT YOU FROM MEDICAL APPOINTMENTS, MEETINGS, WORK OR FROM GETTING THINGS NEEDED FOR DAILY LIVING?: PATIENT DECLINED

## 2025-03-14 ASSESSMENT — SOCIAL DETERMINANTS OF HEALTH (SDOH)
WITHIN THE PAST 12 MONTHS, THE FOOD YOU BOUGHT JUST DIDN'T LAST AND YOU DIDN'T HAVE MONEY TO GET MORE: PATIENT DECLINED
WITHIN THE PAST 12 MONTHS, YOU WORRIED THAT YOUR FOOD WOULD RUN OUT BEFORE YOU GOT THE MONEY TO BUY MORE: PATIENT DECLINED
WITHIN THE LAST YEAR, HAVE TO BEEN RAPED OR FORCED TO HAVE ANY KIND OF SEXUAL ACTIVITY BY YOUR PARTNER OR EX-PARTNER?: PATIENT DECLINED
WITHIN THE LAST YEAR, HAVE YOU BEEN HUMILIATED OR EMOTIONALLY ABUSED IN OTHER WAYS BY YOUR PARTNER OR EX-PARTNER?: PATIENT DECLINED
WITHIN THE LAST YEAR, HAVE YOU BEEN AFRAID OF YOUR PARTNER OR EX-PARTNER?: PATIENT DECLINED
IN THE PAST 12 MONTHS, HAS THE ELECTRIC, GAS, OIL, OR WATER COMPANY THREATENED TO SHUT OFF SERVICE IN YOUR HOME?: PATIENT DECLINED
WITHIN THE LAST YEAR, HAVE YOU BEEN KICKED, HIT, SLAPPED, OR OTHERWISE PHYSICALLY HURT BY YOUR PARTNER OR EX-PARTNER?: PATIENT DECLINED

## 2025-03-14 ASSESSMENT — PATIENT HEALTH QUESTIONNAIRE - PHQ9
SUM OF ALL RESPONSES TO PHQ9 QUESTIONS 1 AND 2: 0
1. LITTLE INTEREST OR PLEASURE IN DOING THINGS: NOT AT ALL
2. FEELING DOWN, DEPRESSED, IRRITABLE, OR HOPELESS: NOT AT ALL

## 2025-03-14 ASSESSMENT — PAIN DESCRIPTION - PAIN TYPE
TYPE: ACUTE PAIN

## 2025-03-14 ASSESSMENT — PAIN SCALES - GENERAL: PAINLEVEL: 0 - NO PAIN

## 2025-03-15 ENCOUNTER — ANESTHESIA EVENT (OUTPATIENT)
Dept: ANESTHESIOLOGY | Facility: MEDICAL CENTER | Age: 25
End: 2025-03-15
Payer: COMMERCIAL

## 2025-03-15 ENCOUNTER — ANESTHESIA (OUTPATIENT)
Dept: ANESTHESIOLOGY | Facility: MEDICAL CENTER | Age: 25
End: 2025-03-15
Payer: COMMERCIAL

## 2025-03-15 LAB
GLUCOSE BLD STRIP.AUTO-MCNC: 100 MG/DL (ref 65–99)
GLUCOSE BLD STRIP.AUTO-MCNC: 81 MG/DL (ref 65–99)
GLUCOSE BLD STRIP.AUTO-MCNC: 89 MG/DL (ref 65–99)
GLUCOSE BLD STRIP.AUTO-MCNC: 97 MG/DL (ref 65–99)

## 2025-03-15 PROCEDURE — 3E0P7VZ INTRODUCTION OF HORMONE INTO FEMALE REPRODUCTIVE, VIA NATURAL OR ARTIFICIAL OPENING: ICD-10-PCS | Performed by: OBSTETRICS & GYNECOLOGY

## 2025-03-15 PROCEDURE — 700111 HCHG RX REV CODE 636 W/ 250 OVERRIDE (IP): Mod: JZ | Performed by: SPECIALIST

## 2025-03-15 PROCEDURE — A9270 NON-COVERED ITEM OR SERVICE: HCPCS | Performed by: OBSTETRICS & GYNECOLOGY

## 2025-03-15 PROCEDURE — 700105 HCHG RX REV CODE 258: Performed by: OBSTETRICS & GYNECOLOGY

## 2025-03-15 PROCEDURE — 303615 HCHG EPIDURAL/SPINAL ANESTHESIA FOR LABOR

## 2025-03-15 PROCEDURE — 700111 HCHG RX REV CODE 636 W/ 250 OVERRIDE (IP): Performed by: ANESTHESIOLOGY

## 2025-03-15 PROCEDURE — 770002 HCHG ROOM/CARE - OB PRIVATE (112)

## 2025-03-15 PROCEDURE — 700102 HCHG RX REV CODE 250 W/ 637 OVERRIDE(OP): Performed by: OBSTETRICS & GYNECOLOGY

## 2025-03-15 PROCEDURE — 700111 HCHG RX REV CODE 636 W/ 250 OVERRIDE (IP): Mod: JZ | Performed by: OBSTETRICS & GYNECOLOGY

## 2025-03-15 PROCEDURE — 82962 GLUCOSE BLOOD TEST: CPT | Mod: 91

## 2025-03-15 RX ORDER — ROPIVACAINE HYDROCHLORIDE 2 MG/ML
INJECTION, SOLUTION EPIDURAL; INFILTRATION
Status: DISCONTINUED | OUTPATIENT
Start: 2025-03-15 | End: 2025-03-16 | Stop reason: SURG

## 2025-03-15 RX ORDER — SODIUM CHLORIDE, SODIUM LACTATE, POTASSIUM CHLORIDE, AND CALCIUM CHLORIDE .6; .31; .03; .02 G/100ML; G/100ML; G/100ML; G/100ML
250 INJECTION, SOLUTION INTRAVENOUS PRN
Status: DISCONTINUED | OUTPATIENT
Start: 2025-03-15 | End: 2025-03-16 | Stop reason: HOSPADM

## 2025-03-15 RX ORDER — ROPIVACAINE HYDROCHLORIDE 2 MG/ML
INJECTION, SOLUTION EPIDURAL; INFILTRATION; PERINEURAL CONTINUOUS
Status: DISCONTINUED | OUTPATIENT
Start: 2025-03-15 | End: 2025-03-18 | Stop reason: HOSPADM

## 2025-03-15 RX ORDER — BUPIVACAINE HYDROCHLORIDE 2.5 MG/ML
INJECTION, SOLUTION EPIDURAL; INFILTRATION; INTRACAUDAL; PERINEURAL
Status: COMPLETED
Start: 2025-03-15 | End: 2025-03-15

## 2025-03-15 RX ORDER — SODIUM CHLORIDE, SODIUM LACTATE, POTASSIUM CHLORIDE, AND CALCIUM CHLORIDE .6; .31; .03; .02 G/100ML; G/100ML; G/100ML; G/100ML
1000 INJECTION, SOLUTION INTRAVENOUS
Status: COMPLETED | OUTPATIENT
Start: 2025-03-15 | End: 2025-03-16

## 2025-03-15 RX ORDER — SODIUM CHLORIDE, SODIUM LACTATE, POTASSIUM CHLORIDE, AND CALCIUM CHLORIDE .6; .31; .03; .02 G/100ML; G/100ML; G/100ML; G/100ML
1000 INJECTION, SOLUTION INTRAVENOUS
Status: DISCONTINUED | OUTPATIENT
Start: 2025-03-15 | End: 2025-03-15

## 2025-03-15 RX ORDER — ROPIVACAINE HYDROCHLORIDE 2 MG/ML
INJECTION, SOLUTION EPIDURAL; INFILTRATION; PERINEURAL CONTINUOUS
Status: DISCONTINUED | OUTPATIENT
Start: 2025-03-15 | End: 2025-03-15

## 2025-03-15 RX ORDER — SODIUM CHLORIDE, SODIUM LACTATE, POTASSIUM CHLORIDE, AND CALCIUM CHLORIDE .6; .31; .03; .02 G/100ML; G/100ML; G/100ML; G/100ML
250 INJECTION, SOLUTION INTRAVENOUS PRN
Status: DISCONTINUED | OUTPATIENT
Start: 2025-03-15 | End: 2025-03-15

## 2025-03-15 RX ORDER — BUPIVACAINE HYDROCHLORIDE 2.5 MG/ML
INJECTION, SOLUTION EPIDURAL; INFILTRATION; INTRACAUDAL; PERINEURAL PRN
Status: DISCONTINUED | OUTPATIENT
Start: 2025-03-15 | End: 2025-03-16 | Stop reason: SURG

## 2025-03-15 RX ORDER — EPHEDRINE SULFATE 50 MG/ML
5 INJECTION, SOLUTION INTRAVENOUS
Status: DISCONTINUED | OUTPATIENT
Start: 2025-03-15 | End: 2025-03-16 | Stop reason: HOSPADM

## 2025-03-15 RX ORDER — EPHEDRINE SULFATE 50 MG/ML
5 INJECTION, SOLUTION INTRAVENOUS
Status: DISCONTINUED | OUTPATIENT
Start: 2025-03-15 | End: 2025-03-15

## 2025-03-15 RX ADMIN — ROPIVACAINE HYDROCHLORIDE 10 ML/HR: 2 INJECTION EPIDURAL; INFILTRATION; PERINEURAL at 17:48

## 2025-03-15 RX ADMIN — FENTANYL CITRATE 100 MCG: 50 INJECTION, SOLUTION INTRAMUSCULAR; INTRAVENOUS at 17:42

## 2025-03-15 RX ADMIN — MISOPROSTOL 25 MCG: 100 TABLET ORAL at 11:18

## 2025-03-15 RX ADMIN — ROPIVACAINE HYDROCHLORIDE: 2 INJECTION, SOLUTION EPIDURAL; INFILTRATION at 17:47

## 2025-03-15 RX ADMIN — FENTANYL CITRATE 100 MCG: 50 INJECTION, SOLUTION INTRAMUSCULAR; INTRAVENOUS at 15:43

## 2025-03-15 RX ADMIN — OXYTOCIN 2 MILLI-UNITS/MIN: 10 INJECTION, SOLUTION INTRAMUSCULAR; INTRAVENOUS at 15:59

## 2025-03-15 RX ADMIN — ONDANSETRON 4 MG: 2 INJECTION INTRAMUSCULAR; INTRAVENOUS at 15:43

## 2025-03-15 RX ADMIN — BUPIVACAINE HYDROCHLORIDE 10 ML: 2.5 INJECTION, SOLUTION EPIDURAL; INFILTRATION; INTRACAUDAL at 17:42

## 2025-03-15 ASSESSMENT — PAIN DESCRIPTION - PAIN TYPE
TYPE: ACUTE PAIN

## 2025-03-15 NOTE — PROGRESS NOTES
0430- Report received from [place]. POC discussed. Care assumed.   0700- Report given to Lolly ELIAS RN. POC discussed. Care relinquished.

## 2025-03-15 NOTE — PROGRESS NOTES
y/o  EDC 3/19, EGA 39.2,here to L&D for scheduled induction for GDM.  EFM/TOCO applied, pt states positive FM. Denies vaginal LOF or bleeding. Pt medica and pregnancy hx reviewed and confirmed with pt. VS taken  and pt assessment completed.     - Dr. Pierre informed  induction is arrival, SVE, fetal tracing and ctx pattern. MD orders for pt vaginal admission order set, Q4H Blood sugars during latent labor and Q2H during active and to start pt induction with cervidil.     0430- Full report given to Anu SHELDON RN, pt care relinquished.

## 2025-03-15 NOTE — CARE PLAN
The patient is Watcher - Medium risk of patient condition declining or worsening    Shift Goals  Clinical Goals: Cervical Change and Dilation  Patient Goals: Healthy Mom; Healthy Baby  Family Goals: Comfort and Support    Progress made toward(s) clinical / shift goals:      Problem: Knowledge Deficit - L&D  Goal: Patient and family/caregivers will demonstrate understanding of plan of care, disease process/condition, diagnostic tests and medications  Description: Target End Date:  1-3 days or as soon as patient condition allows1.  Oriented to unit, equipment, visitation policy and means for communicating concern2.  Complete/review Learning Assessment3.  Assess patient's preparation, knowledge level and expectations4.  Provide accurate information in basic terms, clarify misconceptions5.  Explain disease process/condition, treatment plan, diagnostic tests and medications6.  Encourage patient and support person to ask questions and verbalize their understanding7.  Instruct patient/support person in basic interpretation of fetal monitor8.  Obtain informed consent when appropriate9.  Demonstrate breathing/relaxation techniques  Outcome: Progressing     Problem: Psychosocial - L&D  Goal: Patient's level of anxiety will decrease  Outcome: Progressing     Problem: Risk for Infection and Impaired Wound Healing  Goal: Patient will remain free from infection  Outcome: Progressing  Note: Pt will remain afebrile; and will show no s/s of infection.      Problem: Pain  Goal: Patient's pain will be alleviated or reduced to the patient’s comfort goal  Outcome: Progressing  Note: Pt continuously monitored for pain, educated on pain management options. Call light within reach, pt understands to call RN regarding any needs or concerns.        Patient is not progressing towards the following goals:

## 2025-03-15 NOTE — PROGRESS NOTES
0700- Bedside report received from Anu CARR, IOL POC discussed, care assumed with pt in stable condition, SO at side. Pt states that she able to get some rest throughout the night but did have mild back pain-- discussed that this is normal related to her cervical ripening medication; comfort and pain management options discussed. Call light within reach; encouraged to call with any needs.     0953- Call made to Dr. Juliana Jc regarding pt cervical status; provider to come to bedside to assess options for further augmentation medications.   1000- 12hr chart check completed.     1309- Orders received for intermittent fetal monitoring while on cervical ripening medication after obtaining a reactive NST. Pt allowed to be off monitors for 30mins at a time.   1342- Pt called out with c/o LOF; fluid clear and no odor. Disposable panties and pad given.     1510- Call made to Dr. Juliana Jc; orders received to begin augmenting with IV pitocin 2x2. Policy reviewed with MD and would like to continue with current order.     1900- Bedside report given to Alexandria CARR, augmentation, pain management and delivery POC discussed, care relinquished with pt in stable condition.

## 2025-03-15 NOTE — H&P
History and Physical      Homa Narvaez is a 24 y.o. female  at 39w3d who presents for IOL, elective.     Subjective:   negative  For CTXS.   negative Feels pain   negative for LOF  negative for vaginal bleeding.   positive for fetal movement    ROS: A comprehensive review of systems was negative.    History reviewed. No pertinent past medical history.  Past Surgical History:   Procedure Laterality Date    TONSILLECTOMY       History reviewed. No pertinent family history.  OB History    Para Term  AB Living   1        SAB IAB Ectopic Molar Multiple Live Births              # Outcome Date GA Lbr Farhat/2nd Weight Sex Type Anes PTL Lv   1 Current              Social History     Tobacco Use    Smoking status: Never    Smokeless tobacco: Never   Vaping Use    Vaping status: Never Used   Substance Use Topics    Alcohol use: Never    Drug use: Never     Allergies: Lactose    Current Facility-Administered Medications:     miSOPROStol (Cytotec) tablet 25 mcg, 25 mcg, Vaginal, Q4HRS PRN, Babita Manzo M.D.    LR infusion, , Intravenous, Continuous, Wisam Pierre M.D., Last Rate: 50 mL/hr at 25, New Bag at 25    lidocaine (XYLOCAINE) 1%  injection, 20 mL, Subcutaneous, Once PRN, Wisam Pierre M.D.    terbutaline (Brethine) injection 0.25 mg, 0.25 mg, Subcutaneous, Once PRN, Wisam Pierre M.D.    oxytocin (Pitocin) infusion bolus (for post delivery), 10 Units, Intravenous, Once, Held at 25 **FOLLOWED BY** oxytocin (Pitocin) infusion bolus (for post delivery), 10 Units, Intravenous, Once, Held at 25 **FOLLOWED BY** oxytocin (Pitocin) infusion (for post delivery), 125 mL/hr, Intravenous, Continuous, Wisam Pierre M.D., Held at 25    oxytocin (Pitocin) injection 10 Units, 10 Units, Intramuscular, Once PRN, Wisam Pierre M.D.    ibuprofen (Motrin) tablet 800 mg, 800 mg, Oral, Once PRN, Wisam Pierre M.D.    acetaminophen (Tylenol)  "tablet 1,000 mg, 1,000 mg, Oral, Once PRN, Wisam Pierre M.D.    fentaNYL (Sublimaze) injection 50 mcg, 50 mcg, Intravenous, Q HOUR PRN **OR** fentaNYL (Sublimaze) injection 100 mcg, 100 mcg, Intravenous, Q HOUR PRN, Wisam Pierre M.D.    ondansetron (Zofran ODT) dispertab 4 mg, 4 mg, Oral, Q6HRS PRN **OR** ondansetron (Zofran) syringe/vial injection 4 mg, 4 mg, Intravenous, Q6HRS PRN, Wisam Pierre M.D.    miSOPROStol (Cytotec) tablet 800 mcg, 800 mcg, Rectal, Once PRN, Wisam Pierre M.D.    methylergonovine (Methergine) injection 0.2 mg, 0.2 mg, Intramuscular, Once PRN, Wisam Pierre M.D.    Prenatal care with sta megan :   There are no active problems to display for this patient.      Objective:      /74   Pulse 79   Temp 36.6 °C (97.9 °F) (Temporal)   Resp 16   Ht 1.524 m (5')   Wt 100 kg (220 lb 10.9 oz)   SpO2 96%     General:   no acute distress, alert, cooperative   Skin:   normal   HEENT:  Sclera clear, anicteric   Lungs:   CTA bilateral   Heart:   S1, S2 normal, no murmur, click, rub or gallop, regular rate and rhythm, no hepatosplenomegaly, S1, S2 normal,\"no JVD   Abdomen:   gravid, NT   EFW:  3200   Pelvis:  adequate with gynecoid pelvis   FHT:  150 BPM   Uterine Size: S=D   Presentations: Cephalic   Cervix:     Dilation: FT    Effacement: Long    Station:  -3    Consistency: Soft    Position: Posterior     Lab Review  Recent Results (from the past 35 weeks)   ANTIBODY SCREEN    Collection Time: 01/20/25  4:05 PM   Result Value Ref Range    Antibody Screen Scrn POS (A)    ANTIBODY IDENTIFICATION    Collection Time: 01/20/25  4:05 PM   Result Value Ref Range    Antibody Id POS, AntibodySpecificity:Undetermined (A)    RH TYPE (ONLY)    Collection Time: 01/20/25  4:05 PM   Result Value Ref Range    Rh Grouping Only POS    CBC with differential    Collection Time: 03/14/25  9:00 PM   Result Value Ref Range    WBC 11.3 (H) 4.8 - 10.8 K/uL    RBC 4.74 4.20 - 5.40 M/uL    Hemoglobin " 13.6 12.0 - 16.0 g/dL    Hematocrit 41.8 37.0 - 47.0 %    MCV 88.2 81.4 - 97.8 fL    MCH 28.7 27.0 - 33.0 pg    MCHC 32.5 32.2 - 35.5 g/dL    RDW 42.6 35.9 - 50.0 fL    Platelet Count 149 (L) 164 - 446 K/uL    MPV 12.1 9.0 - 12.9 fL    Neutrophils-Polys 71.00 44.00 - 72.00 %    Lymphocytes 20.30 (L) 22.00 - 41.00 %    Monocytes 7.60 0.00 - 13.40 %    Eosinophils 0.40 0.00 - 6.90 %    Basophils 0.30 0.00 - 1.80 %    Immature Granulocytes 0.40 0.00 - 0.90 %    Nucleated RBC 0.00 0.00 - 0.20 /100 WBC    Neutrophils (Absolute) 8.01 (H) 1.82 - 7.42 K/uL    Lymphs (Absolute) 2.29 1.00 - 4.80 K/uL    Monos (Absolute) 0.86 (H) 0.00 - 0.85 K/uL    Eos (Absolute) 0.05 0.00 - 0.51 K/uL    Baso (Absolute) 0.03 0.00 - 0.12 K/uL    Immature Granulocytes (abs) 0.05 0.00 - 0.11 K/uL    NRBC (Absolute) 0.00 K/uL   T.PALLIDUM AB SANTIAGO (Syphilis)    Collection Time: 03/14/25  9:00 PM   Result Value Ref Range    Syphilis, Treponemal Qual Non-Reactive Non-Reactive   HOLD BLOOD BANK SPECIMEN (NOT TESTED)    Collection Time: 03/14/25  9:00 PM   Result Value Ref Range    Holding Tube - Bb DONE    POCT glucose device results    Collection Time: 03/14/25  9:47 PM   Result Value Ref Range    POC Glucose, Blood 92 65 - 99 mg/dL   POCT glucose device results    Collection Time: 03/15/25  2:35 AM   Result Value Ref Range    POC Glucose, Blood 89 65 - 99 mg/dL   POCT glucose device results    Collection Time: 03/15/25  7:06 AM   Result Value Ref Range    POC Glucose, Blood 81 65 - 99 mg/dL   POCT glucose device results    Collection Time: 03/15/25 11:23 AM   Result Value Ref Range    POC Glucose, Blood 100 (H) 65 - 99 mg/dL        Assessment:   Homa Kimbroughan Solange at 39w3d  Labor status: Not in labor.  Obstetrical history significant for There are no active problems to display for this patient.  .      Plan:     Admit to L&D  GBS negative  Diabetic diet -A1GDM  Cytotec PV for cervical ripening   Pitocin x IOL  Epidural for pain control

## 2025-03-16 LAB — GLUCOSE BLD STRIP.AUTO-MCNC: 106 MG/DL (ref 65–99)

## 2025-03-16 PROCEDURE — 0KQM0ZZ REPAIR PERINEUM MUSCLE, OPEN APPROACH: ICD-10-PCS | Performed by: OBSTETRICS & GYNECOLOGY

## 2025-03-16 PROCEDURE — 82962 GLUCOSE BLOOD TEST: CPT

## 2025-03-16 PROCEDURE — 304965 HCHG RECOVERY SERVICES

## 2025-03-16 PROCEDURE — A9270 NON-COVERED ITEM OR SERVICE: HCPCS | Performed by: OBSTETRICS & GYNECOLOGY

## 2025-03-16 PROCEDURE — 700111 HCHG RX REV CODE 636 W/ 250 OVERRIDE (IP): Performed by: ANESTHESIOLOGY

## 2025-03-16 PROCEDURE — 700105 HCHG RX REV CODE 258: Performed by: ANESTHESIOLOGY

## 2025-03-16 PROCEDURE — 770002 HCHG ROOM/CARE - OB PRIVATE (112)

## 2025-03-16 PROCEDURE — 59409 OBSTETRICAL CARE: CPT

## 2025-03-16 PROCEDURE — 700102 HCHG RX REV CODE 250 W/ 637 OVERRIDE(OP): Performed by: OBSTETRICS & GYNECOLOGY

## 2025-03-16 PROCEDURE — A9270 NON-COVERED ITEM OR SERVICE: HCPCS | Performed by: SPECIALIST

## 2025-03-16 PROCEDURE — 700111 HCHG RX REV CODE 636 W/ 250 OVERRIDE (IP): Mod: JZ | Performed by: SPECIALIST

## 2025-03-16 PROCEDURE — 700102 HCHG RX REV CODE 250 W/ 637 OVERRIDE(OP): Performed by: SPECIALIST

## 2025-03-16 PROCEDURE — 700105 HCHG RX REV CODE 258: Performed by: SPECIALIST

## 2025-03-16 RX ORDER — ACETAMINOPHEN 500 MG
1000 TABLET ORAL EVERY 6 HOURS PRN
Status: DISCONTINUED | OUTPATIENT
Start: 2025-03-16 | End: 2025-03-18 | Stop reason: HOSPADM

## 2025-03-16 RX ORDER — OXYCODONE HYDROCHLORIDE 5 MG/1
5 TABLET ORAL EVERY 4 HOURS PRN
Status: DISCONTINUED | OUTPATIENT
Start: 2025-03-16 | End: 2025-03-18 | Stop reason: HOSPADM

## 2025-03-16 RX ORDER — DOCUSATE SODIUM 100 MG/1
100 CAPSULE, LIQUID FILLED ORAL 2 TIMES DAILY PRN
Status: DISCONTINUED | OUTPATIENT
Start: 2025-03-16 | End: 2025-03-18 | Stop reason: HOSPADM

## 2025-03-16 RX ORDER — IBUPROFEN 800 MG/1
800 TABLET, FILM COATED ORAL EVERY 8 HOURS PRN
Status: DISCONTINUED | OUTPATIENT
Start: 2025-03-16 | End: 2025-03-18 | Stop reason: HOSPADM

## 2025-03-16 RX ORDER — MISOPROSTOL 200 UG/1
1000 TABLET ORAL
Status: DISCONTINUED | OUTPATIENT
Start: 2025-03-16 | End: 2025-03-18 | Stop reason: HOSPADM

## 2025-03-16 RX ORDER — SODIUM CHLORIDE, SODIUM LACTATE, POTASSIUM CHLORIDE, CALCIUM CHLORIDE 600; 310; 30; 20 MG/100ML; MG/100ML; MG/100ML; MG/100ML
2000 INJECTION, SOLUTION INTRAVENOUS PRN
Status: DISCONTINUED | OUTPATIENT
Start: 2025-03-16 | End: 2025-03-18 | Stop reason: HOSPADM

## 2025-03-16 RX ADMIN — SODIUM CHLORIDE, POTASSIUM CHLORIDE, SODIUM LACTATE AND CALCIUM CHLORIDE 1000 ML: 600; 310; 30; 20 INJECTION, SOLUTION INTRAVENOUS at 10:41

## 2025-03-16 RX ADMIN — ROPIVACAINE HYDROCHLORIDE: 2 INJECTION, SOLUTION EPIDURAL; INFILTRATION at 03:28

## 2025-03-16 RX ADMIN — OXYTOCIN 125 ML/HR: 10 INJECTION, SOLUTION INTRAMUSCULAR; INTRAVENOUS at 13:07

## 2025-03-16 RX ADMIN — IBUPROFEN 800 MG: 800 TABLET, FILM COATED ORAL at 19:57

## 2025-03-16 RX ADMIN — IBUPROFEN 800 MG: 800 TABLET, FILM COATED ORAL at 11:22

## 2025-03-16 RX ADMIN — MISOPROSTOL 1000 MCG: 200 TABLET ORAL at 10:32

## 2025-03-16 ASSESSMENT — PAIN DESCRIPTION - PAIN TYPE
TYPE: ACUTE PAIN

## 2025-03-16 ASSESSMENT — PAIN SCALES - GENERAL: PAIN_LEVEL: 0

## 2025-03-16 NOTE — CARE PLAN
The patient is Stable - Low risk of patient condition declining or worsening    Shift Goals  Clinical Goals: Cervical change, labor progression, and successful vaginal delivery with healthy mom and baby.  Patient Goals: Healthy mom and baby.  Family Goals: Support.    Progress made toward(s) clinical / shift goals:    Problem: Psychosocial - Postpartum  Goal: Patient will verbalize and demonstrate effective bonding and parenting behavior  Outcome: Progressing parents attentive to infant needs.     Problem: Altered Physiologic Condition  Goal: Patient physiologically stable as evidenced by normal lochia, palpable uterine involution and vitals within normal limits  Outcome: Progressing fundus firm lochia wnl, no clots passed.       Patient is not progressing towards the following goals:

## 2025-03-16 NOTE — ANESTHESIA PREPROCEDURE EVALUATION
Date: 03/15/25  Procedure: Labor Epidural         Relevant Problems   No relevant active problems       Physical Exam    Airway   Mallampati: II  TM distance: >3 FB  Neck ROM: full       Cardiovascular - normal exam  Rhythm: regular  Rate: normal  (-) murmur     Dental - normal exam           Pulmonary - normal exam  Breath sounds clear to auscultation     Abdominal    Neurological - normal exam               Anesthesia Plan    ASA 3   ASA physical status 3 criteria: morbid obesity - BMI greater than or equal to 40    Plan - general               Induction: intravenous    Postoperative Plan: Postoperative administration of opioids is intended.    Pertinent diagnostic labs and testing reviewed    Informed Consent:    Anesthetic plan and risks discussed with patient.    Use of blood products discussed with: patient whom consented to blood products.

## 2025-03-16 NOTE — CARE PLAN
Problem: Psychosocial - L&D  Goal: Patient's level of anxiety will decrease  Outcome: Progressing  Note: Collaborate with patient and family/caregiver to identify triggers and develop strategies to cope with anxiety. Implement stimuli reduction, calming techniques. Pharmacologic management per provider order. Encourage patient/family/care giver participation      Problem: Risk for Infection and Impaired Wound Healing  Goal: Patient will remain free from infection  Outcome: Progressing  Note: Utilize Standard Precautions at all times to reduce the risk of transmission of microorganisms from both recognized and unrecognized sources of infection. Infection prevention handouts provided (general/device/diagnosis specific) and documented in Patient Education. Limit vaginal exams as necessary. Use aseptic technique during vaginal exams. Administer antibiotic therapy per provider order. Assess for removal of lines and drains. Line/drain care per policy and/or provider orders      Problem: Skin Integrity  Goal: Skin integrity is maintained or improved  Outcome: Progressing  Note:  Assess and monitor skin integrity, appearance and/or temperature. Assess risk factors for impaired skin integrity and/or pressures ulcers. Implement precautions to protect skin integrity in collaboration with interdisciplinary team. Implement pressure ulcer prevention protocol if at risk for skin breakdown.   The patient is Stable - Low risk of patient condition declining or worsening    Shift Goals  Clinical Goals: Cervical change, labor progression, and successful vaginal delivery with healthy mom and baby.  Patient Goals: Healthy mom and baby.  Family Goals: Support.    Progress made toward(s) clinical / shift goals:  Progressing

## 2025-03-16 NOTE — PROGRESS NOTES
Patient transferred from labor and delivery to room 327. Patient and family oriented to room and postpartum routine. Assessment done. Patient has no c/o pain. Lochia light  Fundus firm. IV site without redness, swelling or drainage.2nd bag pitocin placed on pump at 125/hr.cuddles alarm is active. Bands matched. Safety and security reviewed.Patient will call for assistance up to the bathroom.

## 2025-03-16 NOTE — ANESTHESIA PROCEDURE NOTES
Epidural Block    Date/Time: 3/15/2025 5:31 PM    Performed by: Abhijeet Peña M.D.  Authorized by: Abhijeet Peña M.D.    Patient Location:  OB  Start Time:  3/15/2025 5:31 PM  End Time:  3/15/2025 5:43 PM  Reason for Block: labor analgesia    patient identified, IV checked, site marked, risks and benefits discussed, surgical consent, monitors and equipment checked and pre-op evaluation    Patient Position:  Sitting  Prep: ChloraPrep, patient draped and sterile technique    Monitoring:  Blood pressure, continuous pulse oximetry and heart rate  Approach:  Midline  Location:  L3-L4  Injection Technique:  IVONNE air  Skin infiltration:  Lidocaine  Strength:  1%  Dose:  3ml  Needle Type:  Tuohy  Needle Gauge:  17 G  Needle Length:  3.5 in  Loss of resistance::  8  Catheter Size:  19 G  Catheter at Skin Depth:  20  Test Dose Result:  Negative

## 2025-03-16 NOTE — PROGRESS NOTES
0700 Report received from Alexandria RN, POC discussed. Pt denies any further needs at this time.     0732 Dr. Juliana Guzman updated on pt status    0825 MD in house, pushing started    0953 Dr. Juliana guzman at bedside for delivery    1000 Delivery of viable baby girl, tight nuchal x1, apgars 8/9, , 1* laceration repaired. Mom and baby resting comfortably skin to skin.     1130 Pt resting comfortably at this time and denies any further needs.     1230 RN at bedside, pt assisted up to the restroom but was unable to void. Voiding expectations reviewed. Pt transferred to PP in stable condition.     1255 Report given to Leticia FONG, POC discussed.

## 2025-03-16 NOTE — PROGRESS NOTES
L&D Progress Note    Name:   Homa Narvaez   Date/Time:  3/16/2025 08:50 AM  Gestational Age:  39w4d  Admit Date:   3/14/2025  Admitting Dx:   Labor and delivery, indication for care [O75.9]  IOL elective   Subjective:      Objective:   Vitals:    03/16/25 0658 03/16/25 0700 03/16/25 0717 03/16/25 0738   BP: 129/75  128/76 110/72   Pulse: 86  85 85   Resp:       Temp:  36.1 °C (97 °F)     TempSrc:  Temporal     SpO2:       Weight:       Height:         Cat I  [place]   Cervix complete     Meds:   Epidural : .  yes  Magnesium sulfate: . N\A  Pitocin: .  yes    Labs:  Recent Results (from the past 72 hours)   CBC with differential    Collection Time: 03/14/25  9:00 PM   Result Value Ref Range    WBC 11.3 (H) 4.8 - 10.8 K/uL    RBC 4.74 4.20 - 5.40 M/uL    Hemoglobin 13.6 12.0 - 16.0 g/dL    Hematocrit 41.8 37.0 - 47.0 %    MCV 88.2 81.4 - 97.8 fL    MCH 28.7 27.0 - 33.0 pg    MCHC 32.5 32.2 - 35.5 g/dL    RDW 42.6 35.9 - 50.0 fL    Platelet Count 149 (L) 164 - 446 K/uL    MPV 12.1 9.0 - 12.9 fL    Neutrophils-Polys 71.00 44.00 - 72.00 %    Lymphocytes 20.30 (L) 22.00 - 41.00 %    Monocytes 7.60 0.00 - 13.40 %    Eosinophils 0.40 0.00 - 6.90 %    Basophils 0.30 0.00 - 1.80 %    Immature Granulocytes 0.40 0.00 - 0.90 %    Nucleated RBC 0.00 0.00 - 0.20 /100 WBC    Neutrophils (Absolute) 8.01 (H) 1.82 - 7.42 K/uL    Lymphs (Absolute) 2.29 1.00 - 4.80 K/uL    Monos (Absolute) 0.86 (H) 0.00 - 0.85 K/uL    Eos (Absolute) 0.05 0.00 - 0.51 K/uL    Baso (Absolute) 0.03 0.00 - 0.12 K/uL    Immature Granulocytes (abs) 0.05 0.00 - 0.11 K/uL    NRBC (Absolute) 0.00 K/uL   T.PALLIDUM AB SANTIAGO (Syphilis)    Collection Time: 03/14/25  9:00 PM   Result Value Ref Range    Syphilis, Treponemal Qual Non-Reactive Non-Reactive   HOLD BLOOD BANK SPECIMEN (NOT TESTED)    Collection Time: 03/14/25  9:00 PM   Result Value Ref Range    Holding Tube - Bb DONE    POCT glucose device results    Collection Time: 03/14/25  9:47 PM   Result Value Ref  Range    POC Glucose, Blood 92 65 - 99 mg/dL   POCT glucose device results    Collection Time: 03/15/25  2:35 AM   Result Value Ref Range    POC Glucose, Blood 89 65 - 99 mg/dL   POCT glucose device results    Collection Time: 03/15/25  7:06 AM   Result Value Ref Range    POC Glucose, Blood 81 65 - 99 mg/dL   POCT glucose device results    Collection Time: 03/15/25 11:23 AM   Result Value Ref Range    POC Glucose, Blood 100 (H) 65 - 99 mg/dL   POCT glucose device results    Collection Time: 03/15/25  6:59 PM   Result Value Ref Range    POC Glucose, Blood 97 65 - 99 mg/dL   POCT glucose device results    Collection Time: 25  3:08 AM   Result Value Ref Range    POC Glucose, Blood 106 (H) 65 - 99 mg/dL       Assessment:   Gestational Age:   39w4d  Started pushing  Anticipate     There are no active problems to display for this patient.      Babita Manzo M.D.

## 2025-03-16 NOTE — L&D DELIVERY NOTE
DELIVERY NOTE    Date of Delivery: 3/16/2025    Primary OB: Babita Andrews. Babita CLANCY     24 year old  39.3 weeks, elective IOL at term.   Her cervix on admission was FT/0/-3.  GBS negative.   She was given cervidil PV x 1 and cytotec PV x 1 for cervical ripening.   She SROM'd at 1342 (3/15) that showed clear AF.   Pitocin was then started for induction/augmentation.   She progressed uneventfully in labor and delivered via  at 1000 over 2nd degree vaginal laceration under epidural anesthesia.   to a live born baby girl (Sherice Bellamy)  in OA position with tight nuchal cord x 1.   BW pending  AS .  Baby was placed skin to skin, good suctioning of the nose and mouth was done, delayed cord clamping was done and cut by FOB (Anthony) and baby handed off to the RN in attendance of further care.   Clear  AF.  Placenta was delivered spontaneously at 1014 complete and intact with 3 vessel cord.   Vaginal laceration was repaired using Chromic 3-0 in a normal usual sterile fashion.  Estimated Blood loss- 200 cc  Uterus noted to be firm and contracted immediately postpartum.  No other cervical nor vaginal lacerations noted.  Patient tolerated the procedure well. No complications encountered.  Both patient and baby are in good condition.     Sponge count was correct x 2. Vaginal exam at end of the procedure revealed no sponges no foreign body in the vaginal vault.    Babita Jc MD

## 2025-03-16 NOTE — PROGRESS NOTES
L&D Progress Note    Name:   Homa Narvaez   Date/Time:  3/15/2025 5:15 PM  Gestational Age:  39w3d  Admit Date:   3/14/2025  Admitting Dx:   Labor and delivery, indication for care [O75.9]  IOL   Subjective:  (+) pain with contractions     Objective:   Vitals:    03/15/25 0720 03/15/25 1120 03/15/25 1530 03/15/25 1543   BP:  118/74     Pulse: (!) 118 79     Resp:    16   Temp: 36.5 °C (97.7 °F) 36.6 °C (97.9 °F) 36.3 °C (97.4 °F)    TempSrc: Temporal Temporal Temporal    SpO2:       Weight:       Height:         Cat I  [place]     Meds:   Epidural : .  no  Magnesium sulfate: . N\A  Pitocin: .  yes    Labs:  Recent Results (from the past 72 hours)   CBC with differential    Collection Time: 03/14/25  9:00 PM   Result Value Ref Range    WBC 11.3 (H) 4.8 - 10.8 K/uL    RBC 4.74 4.20 - 5.40 M/uL    Hemoglobin 13.6 12.0 - 16.0 g/dL    Hematocrit 41.8 37.0 - 47.0 %    MCV 88.2 81.4 - 97.8 fL    MCH 28.7 27.0 - 33.0 pg    MCHC 32.5 32.2 - 35.5 g/dL    RDW 42.6 35.9 - 50.0 fL    Platelet Count 149 (L) 164 - 446 K/uL    MPV 12.1 9.0 - 12.9 fL    Neutrophils-Polys 71.00 44.00 - 72.00 %    Lymphocytes 20.30 (L) 22.00 - 41.00 %    Monocytes 7.60 0.00 - 13.40 %    Eosinophils 0.40 0.00 - 6.90 %    Basophils 0.30 0.00 - 1.80 %    Immature Granulocytes 0.40 0.00 - 0.90 %    Nucleated RBC 0.00 0.00 - 0.20 /100 WBC    Neutrophils (Absolute) 8.01 (H) 1.82 - 7.42 K/uL    Lymphs (Absolute) 2.29 1.00 - 4.80 K/uL    Monos (Absolute) 0.86 (H) 0.00 - 0.85 K/uL    Eos (Absolute) 0.05 0.00 - 0.51 K/uL    Baso (Absolute) 0.03 0.00 - 0.12 K/uL    Immature Granulocytes (abs) 0.05 0.00 - 0.11 K/uL    NRBC (Absolute) 0.00 K/uL   T.PALLIDUM AB SANTIAGO (Syphilis)    Collection Time: 03/14/25  9:00 PM   Result Value Ref Range    Syphilis, Treponemal Qual Non-Reactive Non-Reactive   HOLD BLOOD BANK SPECIMEN (NOT TESTED)    Collection Time: 03/14/25  9:00 PM   Result Value Ref Range    Holding Tube - Bb DONE    POCT glucose device results    Collection  Time: 03/14/25  9:47 PM   Result Value Ref Range    POC Glucose, Blood 92 65 - 99 mg/dL   POCT glucose device results    Collection Time: 03/15/25  2:35 AM   Result Value Ref Range    POC Glucose, Blood 89 65 - 99 mg/dL   POCT glucose device results    Collection Time: 03/15/25  7:06 AM   Result Value Ref Range    POC Glucose, Blood 81 65 - 99 mg/dL   POCT glucose device results    Collection Time: 03/15/25 11:23 AM   Result Value Ref Range    POC Glucose, Blood 100 (H) 65 - 99 mg/dL         Assessment:   Gestational Age:   39w3d  Risk Factors:   SROM - clear   Titrate Pitocn   Epidural for pain mgt   There are no active problems to display for this patient.      Babita Manzo M.D.

## 2025-03-16 NOTE — ANESTHESIA POSTPROCEDURE EVALUATION
Patient: Homa Narvaez    Procedure Summary       Date: 03/15/25 Room / Location:     Anesthesia Start: 1730 Anesthesia Stop: 03/16/25 1000    Procedure: Labor Epidural Diagnosis:     Scheduled Providers:  Responsible Provider: Scottie Raygoza M.D.    Anesthesia Type: general ASA Status: 3            Final Anesthesia Type: general  Last vitals  BP   Blood Pressure: 125/69    Temp   36.1 °C (97 °F)    Pulse   90   Resp   16    SpO2   96 %      Anesthesia Post Evaluation    Patient location during evaluation: PACU  Patient participation: complete - patient participated  Level of consciousness: awake and alert  Pain score: 0    Airway patency: patent  Anesthetic complications: no  Cardiovascular status: hemodynamically stable  Respiratory status: acceptable  Hydration status: euvolemic    PONV: none          No notable events documented.     Nurse Pain Score: 4 (NPRS)

## 2025-03-16 NOTE — CARE PLAN
The patient is Stable - Low risk of patient condition declining or worsening    Shift Goals  Clinical Goals: cervical ripening; reasurring FHT; maintain FSBG WDL  Patient Goals: safe delivery for mom and baby; epidural for pain management when desired  Family Goals: support and education    Progress made toward(s) clinical / shift goals:    Problem: Psychosocial - L&D  Goal: Patient's level of anxiety will decrease  Outcome: Progressing  Note: Pt continuously encouraged to voice all thoughts and feelings; different coping skills discussed.     Problem: Risk for Injury  Goal: Patient and fetus will be free of preventable injury/complications  Outcome: Met  Note: Continuous monitoring of uterine activity and fetal well being.      Problem: Pain  Goal: Patient's pain will be alleviated or reduced to the patient’s comfort goal  Outcome: Met  Note: Pain assessed hourly; pain managed with working epidural at this time.       Patient is not progressing towards the following goals: n/a

## 2025-03-16 NOTE — ANESTHESIA TIME REPORT
Anesthesia Start and Stop Event Times       Date Time Event    3/15/2025 1725 Ready for Procedure     1730 Anesthesia Start    3/16/2025 1000 Anesthesia Stop          Responsible Staff  03/15/25 to 03/16/25      Name Role Begin End    Abhijeet Peña M.D. Anesth 1730 0711    Scottie Raygoza M.D. Anesth 0711 1000          Overtime Reason:  no overtime (within assigned shift)    Comments:

## 2025-03-16 NOTE — PROGRESS NOTES
1900- Full report rec'd from Caro YEH RN, pt care assumed at this time.     0700- Full report given to Damon FONG, pt care relinquished at this time.

## 2025-03-17 LAB
ERYTHROCYTE [DISTWIDTH] IN BLOOD BY AUTOMATED COUNT: 45 FL (ref 35.9–50)
HCT VFR BLD AUTO: 37 % (ref 37–47)
HGB BLD-MCNC: 12 G/DL (ref 12–16)
MCH RBC QN AUTO: 29.6 PG (ref 27–33)
MCHC RBC AUTO-ENTMCNC: 32.4 G/DL (ref 32.2–35.5)
MCV RBC AUTO: 91.1 FL (ref 81.4–97.8)
PLATELET # BLD AUTO: 122 K/UL (ref 164–446)
PMV BLD AUTO: 12.5 FL (ref 9–12.9)
RBC # BLD AUTO: 4.06 M/UL (ref 4.2–5.4)
WBC # BLD AUTO: 22.1 K/UL (ref 4.8–10.8)

## 2025-03-17 PROCEDURE — 85027 COMPLETE CBC AUTOMATED: CPT

## 2025-03-17 PROCEDURE — A9270 NON-COVERED ITEM OR SERVICE: HCPCS | Performed by: OBSTETRICS & GYNECOLOGY

## 2025-03-17 PROCEDURE — 770002 HCHG ROOM/CARE - OB PRIVATE (112)

## 2025-03-17 PROCEDURE — 36415 COLL VENOUS BLD VENIPUNCTURE: CPT

## 2025-03-17 PROCEDURE — 700102 HCHG RX REV CODE 250 W/ 637 OVERRIDE(OP): Performed by: OBSTETRICS & GYNECOLOGY

## 2025-03-17 RX ADMIN — IBUPROFEN 800 MG: 800 TABLET, FILM COATED ORAL at 04:20

## 2025-03-17 RX ADMIN — IBUPROFEN 800 MG: 800 TABLET, FILM COATED ORAL at 13:48

## 2025-03-17 RX ADMIN — DOCUSATE SODIUM 100 MG: 100 CAPSULE, LIQUID FILLED ORAL at 13:48

## 2025-03-17 RX ADMIN — DOCUSATE SODIUM 100 MG: 100 CAPSULE, LIQUID FILLED ORAL at 21:47

## 2025-03-17 RX ADMIN — IBUPROFEN 800 MG: 800 TABLET, FILM COATED ORAL at 21:47

## 2025-03-17 ASSESSMENT — EDINBURGH POSTNATAL DEPRESSION SCALE (EPDS)
I HAVE BEEN ANXIOUS OR WORRIED FOR NO GOOD REASON: HARDLY EVER
THINGS HAVE BEEN GETTING ON TOP OF ME: NO, MOST OF THE TIME I HAVE COPED QUITE WELL
I HAVE BEEN ABLE TO LAUGH AND SEE THE FUNNY SIDE OF THINGS: AS MUCH AS I ALWAYS COULD
I HAVE FELT SCARED OR PANICKY FOR NO GOOD REASON: NO, NOT AT ALL
I HAVE BEEN SO UNHAPPY THAT I HAVE HAD DIFFICULTY SLEEPING: NOT AT ALL
I HAVE LOOKED FORWARD WITH ENJOYMENT TO THINGS: AS MUCH AS I EVER DID
I HAVE FELT SAD OR MISERABLE: NO, NOT AT ALL
I HAVE BLAMED MYSELF UNNECESSARILY WHEN THINGS WENT WRONG: NOT VERY OFTEN
THE THOUGHT OF HARMING MYSELF HAS OCCURRED TO ME: NEVER
I HAVE BEEN SO UNHAPPY THAT I HAVE BEEN CRYING: NO, NEVER

## 2025-03-17 ASSESSMENT — PAIN DESCRIPTION - PAIN TYPE
TYPE: ACUTE PAIN

## 2025-03-17 NOTE — PROGRESS NOTES
Assessment complete, pt resting comfortably in bed at this time. Fundus firm, lochia scant. Pain controlled with prn medications per MAR and non-pharmacological methods. Pt states voiding without difficulty. POC discussed. Call light in reach of pt, encouraged to call for assistance.

## 2025-03-17 NOTE — CARE PLAN
Problem: Knowledge Deficit - Postpartum  Goal: Patient will verbalize and demonstrate understanding of self and infant care  Outcome: Progressing     Problem: Psychosocial - Postpartum  Goal: Patient will verbalize and demonstrate effective bonding and parenting behavior  Outcome: Progressing     Problem: Altered Physiologic Condition  Goal: Patient physiologically stable as evidenced by normal lochia, palpable uterine involution and vitals within normal limits  Outcome: Progressing   The patient is Stable - Low risk of patient condition declining or worsening    Shift Goals  Clinical Goals: vss, pain control  Patient Goals: rest comfort breasfeed  Family Goals: support    Progress made toward(s) clinical / shift goals:       Patient is not progressing towards the following goals:

## 2025-03-17 NOTE — PROGRESS NOTES
Post Partum Progress Note    Name:   Homa Narvaez   Date/Time:  3/17/2025 - 10:48 AM  Chief Admitting Dx:  Labor and delivery, indication for care [O75.9]  Delivery Type:  vaginal, spontaneous  Post-Op/Post Partum Days #:  1    Subjective:  Abdominal pain: no  Ambulating:   yes  Tolerating liquids:  yes  Tolerating food:  yes common adult  Flatus:   yes  BM:    yes  Bleeding:   without any bleeding  Voiding:   yes  Dizziness:   no  Feeding:   breast    Vitals:    03/16/25 1226 03/16/25 1800 03/16/25 2200 03/17/25 0600   BP: 134/79 128/87 114/69 129/82   Pulse: 75 67 88 86   Resp:  18 18 18   Temp:  36.3 °C (97.4 °F) 35.8 °C (96.5 °F) 36.2 °C (97.2 °F)   TempSrc:  Temporal Temporal Temporal   SpO2:  98% 95% 95%   Weight:       Height:           Exam:  Breast: Tenderness no  Abdomen: Abdomen soft, non-tender. BS normal. No masses,  No organomegaly  Fundal Tenderness:  no  Fundus Firm: yes  Incision: dry and intact  Below umbilicus: yes  Perineum: perineum intact  Lochia: mild  Extremities: Normal extremities, peripheral pulses and reflexes normal    Meds:  Current Facility-Administered Medications   Medication Dose    lactated ringers infusion  2,000 mL    docusate sodium (Colace) capsule 100 mg  100 mg    ibuprofen (Motrin) tablet 800 mg  800 mg    acetaminophen (Tylenol) tablet 1,000 mg  1,000 mg    measles, mumps and rubella vaccine (Mmr) injection 0.5 mL  0.5 mL    PRN oxytocin (PITOCIN) (20 Units/1000 mL) PRN for excessive uterine bleeding - See Admin Instr  125-999 mL/hr    miSOPROStol (Cytotec) tablet 1,000 mcg  1,000 mcg    oxyCODONE immediate-release (Roxicodone) tablet 5 mg  5 mg    oxytocin (Pitocin) 0.02 Units/mL  0-20 america-units/min    ropivacaine 0.2 % (Naropin) injection      LR infusion      oxytocin (Pitocin) infusion (for post delivery)  125 mL/hr       Labs:   Recent Labs     03/14/25  2100 03/17/25  0349   WBC 11.3* 22.1*   RBC 4.74 4.06*   HEMOGLOBIN 13.6 12.0   HEMATOCRIT 41.8 37.0   MCV  88.2 91.1   MCH 28.7 29.6   MCHC 32.5 32.4   RDW 42.6 45.0   PLATELETCT 149* 122*   MPV 12.1 12.5       Assessment:  Chief Admitting Dx:  Labor and delivery, indication for care [O75.9]  Delivery Type:  vaginal, spontaneous  Tubal Ligation:  no    Plan:  Continue routine post partum care.  Breast feed  Ambulate  Anticipate d/c home tomorrow PPD 2    Babita Manzo M.D.

## 2025-03-17 NOTE — LACTATION NOTE
This note was copied from a baby's chart.  Mom is a 25 y/o P1 who delivered baby girl weighing 6 # 14.2 oz at 39.4 wks. Mom reports breast changes during pregnancy. Mom denies any breast surgeries, thyroid or fertility issues.  Mom has diet controlled GDM  Mom reports baby has been struggling latching onto breasts. Mom has fed a few times with some suckling but mostly baby is unable to sustain any latch.   Mom hand expressed 3.5 mls of colostrum and a spoon fed to baby earlier this morning.   Mom states bay latches better on left than right side.   - When LC arrived mom was attempting to feed baby.  Baby was unable to sustain latch. Mom has short shanked nipples.   After several attempts to latch with some improvement noted, LC placed a 24 mm NS with instructions for placement and use.  Baby latched well with audible swallows noted and colostrum in the NS.   LC removed shield and baby was able to latch without but then pushed off and nipples shield was replaced.   Mom was able to identify a deep latch with rhythmic massaging at the breast . Swallows were note once again.   Plan of care moving forward.   Mom will feed on demand 8 or more times in 24 hours , keeping feeds less than 3 hours apart. Attempt to latch with out NS first then place if having difficulty.   After next feeding bedside RN will set up a breast pump for mom to increase stimulation at the breast .   Mom does have a pump at home.  All questions answered for parents.

## 2025-03-17 NOTE — CARE PLAN
The patient is Stable - Low risk of patient condition declining or worsening    Shift Goals  Clinical Goals: fundus and lochia WNL, pain management  Patient Goals: pain management, rest  Family Goals: support    Progress made toward(s) clinical / shift goals: Fundus firm, lochia scant. Pt's pain managed with prn pain medications and non-pharmacological methods. Pt resting with support person at bedside.    Problem: Pain - Standard  Goal: Alleviation of pain or a reduction in pain to the patient’s comfort goal  Outcome: Progressing     Problem: Psychosocial - Postpartum  Goal: Patient will verbalize and demonstrate effective bonding and parenting behavior  Outcome: Progressing     Problem: Altered Physiologic Condition  Goal: Patient physiologically stable as evidenced by normal lochia, palpable uterine involution and vitals within normal limits  Outcome: Progressing     Patient is not progressing towards the following goals: NA

## 2025-03-18 ENCOUNTER — PHARMACY VISIT (OUTPATIENT)
Dept: PHARMACY | Facility: MEDICAL CENTER | Age: 25
End: 2025-03-18
Payer: COMMERCIAL

## 2025-03-18 VITALS
BODY MASS INDEX: 43.33 KG/M2 | HEIGHT: 60 IN | HEART RATE: 65 BPM | DIASTOLIC BLOOD PRESSURE: 74 MMHG | SYSTOLIC BLOOD PRESSURE: 115 MMHG | WEIGHT: 220.68 LBS | TEMPERATURE: 98.8 F | RESPIRATION RATE: 16 BRPM | OXYGEN SATURATION: 98 %

## 2025-03-18 PROCEDURE — RXMED WILLOW AMBULATORY MEDICATION CHARGE: Performed by: OBSTETRICS & GYNECOLOGY

## 2025-03-18 PROCEDURE — A9270 NON-COVERED ITEM OR SERVICE: HCPCS | Performed by: OBSTETRICS & GYNECOLOGY

## 2025-03-18 PROCEDURE — 700102 HCHG RX REV CODE 250 W/ 637 OVERRIDE(OP): Performed by: OBSTETRICS & GYNECOLOGY

## 2025-03-18 RX ORDER — IBUPROFEN 600 MG/1
600 TABLET, FILM COATED ORAL EVERY 6 HOURS PRN
Qty: 30 TABLET | Refills: 3 | Status: SHIPPED | OUTPATIENT
Start: 2025-03-18

## 2025-03-18 RX ORDER — DOCUSATE SODIUM 100 MG/1
100 CAPSULE, LIQUID FILLED ORAL 2 TIMES DAILY
Qty: 60 CAPSULE | Refills: 3 | Status: SHIPPED | OUTPATIENT
Start: 2025-03-18

## 2025-03-18 RX ADMIN — ACETAMINOPHEN 1000 MG: 500 TABLET ORAL at 07:42

## 2025-03-18 RX ADMIN — IBUPROFEN 800 MG: 800 TABLET, FILM COATED ORAL at 05:09

## 2025-03-18 RX ADMIN — IBUPROFEN 800 MG: 800 TABLET, FILM COATED ORAL at 14:19

## 2025-03-18 SDOH — ECONOMIC STABILITY: TRANSPORTATION INSECURITY
IN THE PAST 12 MONTHS, HAS LACK OF RELIABLE TRANSPORTATION KEPT YOU FROM MEDICAL APPOINTMENTS, MEETINGS, WORK OR FROM GETTING THINGS NEEDED FOR DAILY LIVING?: NO

## 2025-03-18 SDOH — ECONOMIC STABILITY: TRANSPORTATION INSECURITY
IN THE PAST 12 MONTHS, HAS THE LACK OF TRANSPORTATION KEPT YOU FROM MEDICAL APPOINTMENTS OR FROM GETTING MEDICATIONS?: NO

## 2025-03-18 ASSESSMENT — SOCIAL DETERMINANTS OF HEALTH (SDOH)
WITHIN THE LAST YEAR, HAVE YOU BEEN AFRAID OF YOUR PARTNER OR EX-PARTNER?: NO
WITHIN THE PAST 12 MONTHS, THE FOOD YOU BOUGHT JUST DIDN'T LAST AND YOU DIDN'T HAVE MONEY TO GET MORE: NEVER TRUE
WITHIN THE LAST YEAR, HAVE YOU BEEN HUMILIATED OR EMOTIONALLY ABUSED IN OTHER WAYS BY YOUR PARTNER OR EX-PARTNER?: NO
IN THE PAST 12 MONTHS, HAS THE ELECTRIC, GAS, OIL, OR WATER COMPANY THREATENED TO SHUT OFF SERVICE IN YOUR HOME?: NO
WITHIN THE LAST YEAR, HAVE YOU BEEN KICKED, HIT, SLAPPED, OR OTHERWISE PHYSICALLY HURT BY YOUR PARTNER OR EX-PARTNER?: NO
WITHIN THE PAST 12 MONTHS, YOU WORRIED THAT YOUR FOOD WOULD RUN OUT BEFORE YOU GOT THE MONEY TO BUY MORE: NEVER TRUE
WITHIN THE LAST YEAR, HAVE TO BEEN RAPED OR FORCED TO HAVE ANY KIND OF SEXUAL ACTIVITY BY YOUR PARTNER OR EX-PARTNER?: NO

## 2025-03-18 ASSESSMENT — PAIN DESCRIPTION - PAIN TYPE
TYPE: ACUTE PAIN

## 2025-03-18 NOTE — DISCHARGE PLANNING
Discharge Planning Assessment Post Partum    Reviewed record and met with parents of infant at PP bedside    Reason for Referral: history of anxiety  Address: [address] [place] [address]  Type of Living Situation:stable  Mom Diagnosis: post partum  Baby Diagnosis:   Primary Language: English    Name of Baby: Sherice Narvaez  Father of the Baby: Anthony Narvaez  Involved in baby’s care? yes  Contact Information: [phone]    [facility] Care: [facility]  Mom's PCP: Felipa Smallwood  PCP for new baby:Nadiya Berrios    Support System: family  Coping/Bonding between mother & baby: appropriate  Source of Feeding: breast  Supplies for Infant: prepared    Mom's Insurance: [employer]  Baby Covered on Insurance:yes  Mother Employed/School: [employer]  Father Employed : [employer]  Other children in the home/names & ages: no other children    Financial Hardship/Income: denies   Mom's Mental status: alert and oriented  Services used prior to admit: none    CPS History: none  Psychiatric History: mother endorses situational anxiety around pregnancy and first baby. It is well controlled and not acute  Domestic Violence History: none  Drug/ETOH History: none    Resources Provided: PP support  Referrals Made: parents deny need for any referrals     Clearance for Discharge: infant to discharge home to parents.

## 2025-03-18 NOTE — DISCHARGE SUMMARY
Discharge Summary:      Homa Narvaez    Admit Date:   3/14/2025  Discharge Date:  3/18/2025     Admitting diagnosis:  Labor and delivery, indication for care [O75.9]  Discharge Diagnosis: Status post vaginal, spontaneous.  Pregnancy Complications: none  Tubal Ligation:  no        History:  History reviewed. No pertinent past medical history.  OB History    Para Term  AB Living   1 1 1   1   SAB IAB Ectopic Molar Multiple Live Births       0 1      # Outcome Date GA Lbr Farhat/2nd Weight Sex Type Anes PTL Lv   1 Term 25 39w4d  3.125 kg (6 lb 14.2 oz) F Vag-Spont EPI N BINA        Lactose  Patient Active Problem List    Diagnosis Date Noted    Labor and delivery, indication for care 2025        Hospital Course:   24 y.o. , now para 1, was admitted with the above mentioned diagnosis, underwent Induction of Labor, vaginal, spontaneous. Patient postpartum course was unremarkable, with progressive advancement in diet , ambulation and toleration of oral analgesia. Patient without complaints today and desires discharge.      Vitals:    25 2200 25 0600 25 1704 25 0600   BP: 114/69 129/82 121/78 115/74   Pulse: 88 86 74 65   Resp: 18 18 17 16   Temp: 35.8 °C (96.5 °F) 36.2 °C (97.2 °F) 36.3 °C (97.4 °F) 36 °C (96.8 °F)   TempSrc: Temporal Temporal Temporal Temporal   SpO2: 95% 95% 97% 98%   Weight:       Height:           Current Facility-Administered Medications   Medication Dose    lactated ringers infusion  2,000 mL    docusate sodium (Colace) capsule 100 mg  100 mg    ibuprofen (Motrin) tablet 800 mg  800 mg    acetaminophen (Tylenol) tablet 1,000 mg  1,000 mg    measles, mumps and rubella vaccine (Mmr) injection 0.5 mL  0.5 mL    PRN oxytocin (PITOCIN) (20 Units/1000 mL) PRN for excessive uterine bleeding - See Admin Instr  125-999 mL/hr    miSOPROStol (Cytotec) tablet 1,000 mcg  1,000 mcg    oxyCODONE immediate-release (Roxicodone) tablet 5 mg  5 mg    oxytocin  (Pitocin) 0.02 Units/mL  0-20 america-units/min    ropivacaine 0.2 % (Naropin) injection      LR infusion      oxytocin (Pitocin) infusion (for post delivery)  125 mL/hr       Exam:  Breast Exam: negative  Abdomen: Abdomen soft, non-tender. BS normal. No masses,  No organomegaly  Fundus Non Tender: yes  Incision: none  Perineum: 2nd degree vaginal lac  Extremity: extremities, peripheral pulses and reflexes normal     Labs:  Recent Labs     03/17/25  0349   WBC 22.1*   RBC 4.06*   HEMOGLOBIN 12.0   HEMATOCRIT 37.0   MCV 91.1   MCH 29.6   MCHC 32.4   RDW 45.0   PLATELETCT 122*   MPV 12.5        Activity:   Discharge to home  Pelvic Rest x 6 weeks    Assessment:  normal postpartum course  Discharge Assessment: No heavy bleeding or foul vaginal discharge      Follow up: 6 weeks PP     Discharge Meds:   Current Outpatient Medications   Medication Sig Dispense Refill    ibuprofen (MOTRIN) 600 MG Tab Take 1 Tablet by mouth every 6 hours as needed for Moderate Pain. 30 Tablet 3    docusate sodium (COLACE) 100 MG Cap Take 1 Capsule by mouth 2 times a day. 60 Capsule 3       Babita Manzo M.D.

## 2025-03-18 NOTE — PROGRESS NOTES
2145: Assessment completed, fundus firm, lochia scant. Plan to cluster care reviewed. Denies pain at this time, will call if intervention needed.

## 2025-03-18 NOTE — DISCHARGE INSTRUCTIONS
PATIENT DISCHARGE EDUCATION INSTRUCTION SHEET    REASONS TO CALL YOUR OBSTETRICIAN  Persistent fever, shaking, chills (Temperature higher than 100.4) may indicate you have an infection  Heavy bleeding: soaking more than 1 pad per hour; Passing clots an egg-sized clot or bigger may mean you have an postpartum hemorrhage  Foul odor from vagina or bad smelling or discolored discharge or blood  Breast infection (Mastitis symptoms); breast pain, chills, fever, redness or red streaks, may feel flu like symptoms  Urinary pain, burning or frequency  Incision that is not healing, increased redness, swelling, tenderness or pain, or any pus from episiotomy or  site may mean you have an infection  Redness, swelling, warmth, or painful to touch in the calf area of your leg may mean you have a blood clot  Severe or intensified depression, thoughts or feelings of wanting to hurt yourself or someone else   Pain in chest, obstructed breathing or shortness of breath (trouble catching your breath) may mean you are having a postpartum complication. Call your provider immediately   Headache that does not get better, even after taking medicine, a bad headache with vision changes or pain in the upper right area of your belly may mean you have high blood pressure or post birth preeclampsia. Call your provider immediately    HAND WASHING  All family and friends should wash their hands:  Before and after holding the baby  Before feeding the baby  After using the restroom or changing the baby's diaper    WOUND CARE  Ask your physician for additional care instructions. In general:  Episiotomy/Laceration  May use sona-spray bottle, witch hazel pads and dermaplast spray for comfort  Use sona-spray bottle after urinating to cleanse perineal area  To prevent burning during urination spray sona-water bottle on labial area   Pat perineal area dry until episiotomy/laceration is healed  Continue to use sona-bottle until bleeding stops as  needed  If have a 2nd degree laceration or greater, a Sitz bath can offer relief from soreness, burning, and inflammation   Sitz Bath   Sit in 6 inches of warm water and soak laceration as needed until the laceration heals    VAGINAL CARE AND BLEEDING  Nothing inside vagina for 6 weeks:   No sexual intercourse, tampons or douching  Bleeding may continue for 2-4 weeks. Amount and color may vary  Soaking 1 pad or more in an hour for several hours is considered heavy bleeding  Passing large egg sized blood clots can be concerning  If you feel like you have heavy bleeding or are having increasing amount of blood clots call your Obstetrician immediately  If you begin feeling faint upon standing, feeling sick to your stomach, have clammy skin, a really fast heartbeat, have chills, start feeling confused, dizzy, sleepy or weak, or feeling like you're going to faint call your Obstetrician immediately    HYPERTENSION   Preeclampsia or gestational hypertension are types of high blood pressure that only pregnant women can get. It is important for you to be aware of symptoms to seek early intervention and treatment. If you have any of these symptoms immediately call your Obstetrician    Vision changes or blurred vision   Severe headache or pain that is unrelieved with medication and will not go away  Persistent pain in upper abdomen or shoulder   Increased swelling of face, feet, or hands  Difficulty breathing or shortness of breath at rest  Urinating less than usual    URINATION AND BOWEL MOVEMENTS  Eating more fiber (bran cereal, fruits, and vegetables) and drinking plenty of fluids will help to avoid constipation  Urinary frequency and urgency after childbirth is normal  If you experience any urinary pain, burning or frequency call your provider    BIRTH CONTROL  It is possible to become pregnant at any time after delivery and while breastfeeding  Plan to discuss a method of birth control with your physician at your post  "delivery follow up visit    POSTPARTUM BLUES  During the first few days after birth, you may experience a sense of the \"blues\" which may include impatience, irritability or even crying. These feelings come and go quickly. However, as many as 1 in 10 women experience emotional symptoms known as postpartum depression.     POSTPARTUM DEPRESSION  May start as early as the second or third day after delivery or take several weeks or months to develop. Symptoms of \"blues\" are present, but are more intense: Crying spells; loss of appetite; feelings of hopelessness or loss of control; fear of touching the baby; over concern or no concern at all about the baby; little or no concern about your own appearance/caring for yourself; and/or inability to sleep or excessive sleeping. Contact your Obstetrician if you are experiencing any of these symptoms     PREVENTING SHAKEN BABY  If you are angry or stressed, PUT THE BABY IN THE CRIB, step away, take some deep breaths, and wait until you are calm to care for the baby. DO NOT SHAKE THE BABY. You are not alone, call a supporter for help.  [facility] 24/7 crisis call line (024-455-3561) or (1-658.184.8541)  You can also text them, text \"ANSWER\" (778650)  "

## 2025-03-18 NOTE — CARE PLAN
The patient is Stable - Low risk of patient condition declining or worsening    Shift Goals  Clinical Goals: vss, pain control  Patient Goals: rest comfort breasfeed  Family Goals: support    Progress made toward(s) clinical / shift goals:    Problem: Knowledge Deficit - Postpartum  Goal: Patient will verbalize and demonstrate understanding of self and infant care  Description: Target End Date:  1-3 days or as soon as patient condition allowsDocument in Patient Education1.  Assess patient and knowledge of self and infant care2.  Educate patient verbally, by demonstration and written material  Outcome: Progressing     Problem: Psychosocial - Postpartum  Goal: Patient will verbalize and demonstrate effective bonding and parenting behavior  Description: Target End Date:  1 to 4 days1.  Assess patient for anxiety or apprehension regarding parenting role2.  Provide emotional support and encouragement to patient/family/caregiver  Outcome: Progressing     Problem: Altered Physiologic Condition  Goal: Patient physiologically stable as evidenced by normal lochia, palpable uterine involution and vitals within normal limits  Description: Target End Date:  1 to 4 daysDocument on Assessment flowsheet1.  Perform physical assessment and obtain vitals per intrapartum/postpartum standards of care2.  Follow epidural/spinal narcotic protocol if patient has received a long acting narcotic3.  Massage fundus as necessary to prevent excessive lochia4.  Administer pitocin, methergine, cytotec or hemabate as ordered  Outcome: Progressing     Problem: Infection - Postpartum  Goal: Postpartum patient will be free of signs and symptoms of infection  Description: Target End Date:  Target End Date:  1 to 4 days1.  Instruct patient in pericare/incisional care2.  Give antibiotics as indicated an ordered3.  Get patient out of bed and ambulating as ordered  Outcome: Progressing

## 2025-03-18 NOTE — PROGRESS NOTES
0601- Bedside report received from HETAL Whitehead.  Assumed care of patient.  0742- Patient medicated for c/o back ache and perineal discomfort.  Discussed pain management plan, to include MD orders for prn pain medications, as well as the prn of heat packs and the prn use of witch hazel pads and dermoplast spray.  1140- Patient assessment done.  Patient reported she is voiding without difficulty and passing flatus.  Patient denied dizziness and reported she is walking without difficulty.  Reviewed plan of care.  Patient verbalized understanding.  Patient stated desire for discharge home today and was encouraged to read the written patient education/instruction sheet.  1600- Patient stated she read the written patient education/instruction sheet and has no questions.  Discharge medications given to patient after name verified.  Medication instructions reviewed with patient.  Discharge instructions reviewed with patient.  Patient verbalized understanding of instructions and stated she has no questions.  Discharge paperwork signed by patient.  Patient will call when ready for discharge.  1805- Social drivers of health screening completed.  1810- Patient stated that she is ready for discharge.  Patient discharged, no change noted in condition, with FOB.  Infant not discharged at this time.

## 2025-03-18 NOTE — LACTATION NOTE
This note was copied from a baby's chart.  Follow up lactation support: Mom states baby is feeding very well and has latched throughout the night consistently without the NS. Mom does hear swallows and stools are transitioning to smoother dark green.  LC reviewed discharge education for mom. Baby had lab work done this morn for low grade temperature.  Discharge for baby is pending.  Breastfeeding plan:    Feed baby with feeding cues and at least a minimum of 8x/24 hours.  Expect cluster feeding as this is normal during early days of life   It is not recommended to let baby sleep longer than 3 hours between feedings and if sleepy, place skin to skin to promote feeding interest and milk production.  Baby's usually feed more frequently and longer when skin to skin with mother. It is not recommended to use pacifiers or supplementing with formula until breast feeding and milk production is well established or at least 3-4 weeks unless medically indicated.  Use nipple as needed.    Make sure infant is getting enough by ensuring frequent feedings as well as looking for transitioning stools from dark meconium to bright yellow/green seedy loose stools by day 5.     Follow up with PEDS PCP as scheduled for weight checks and to make sure feeding is progressing appropriately.    Mom has a pump at home. LC will send referral to OP breast feeding medicine    Call for breastfeeding support through [facility]) as needed and attend the [facility] without need for appointment.

## 2025-03-19 NOTE — CARE PLAN
The patient is Stable - Low risk of patient condition declining or worsening    Shift Goals  Clinical Goals: Maintain fundus firm, lochia light; pain management; discharge home  Patient Goals: discharge  Family Goals:     Progress made toward(s) clinical / shift goals:  MET

## 2025-06-09 ENCOUNTER — HOSPITAL ENCOUNTER (OUTPATIENT)
Dept: LAB | Facility: MEDICAL CENTER | Age: 25
End: 2025-06-09
Attending: OBSTETRICS & GYNECOLOGY
Payer: COMMERCIAL

## 2025-06-09 LAB
EST. AVERAGE GLUCOSE BLD GHB EST-MCNC: 105 MG/DL
HBA1C MFR BLD: 5.3 % (ref 4–5.6)

## 2025-06-09 PROCEDURE — 36415 COLL VENOUS BLD VENIPUNCTURE: CPT

## 2025-06-09 PROCEDURE — 83036 HEMOGLOBIN GLYCOSYLATED A1C: CPT

## 2025-06-16 ENCOUNTER — APPOINTMENT (OUTPATIENT)
Dept: LAB | Facility: MEDICAL CENTER | Age: 25
End: 2025-06-16
Payer: COMMERCIAL

## 2025-08-08 ENCOUNTER — OFFICE VISIT (OUTPATIENT)
Dept: URGENT CARE | Facility: CLINIC | Age: 25
End: 2025-08-08
Payer: COMMERCIAL

## 2025-08-08 VITALS
OXYGEN SATURATION: 97 % | TEMPERATURE: 97.5 F | HEIGHT: 60 IN | DIASTOLIC BLOOD PRESSURE: 60 MMHG | HEART RATE: 72 BPM | BODY MASS INDEX: 37.3 KG/M2 | WEIGHT: 190 LBS | RESPIRATION RATE: 16 BRPM | SYSTOLIC BLOOD PRESSURE: 112 MMHG

## 2025-08-08 DIAGNOSIS — B37.2 YEAST INFECTION OF THE SKIN: ICD-10-CM

## 2025-08-08 DIAGNOSIS — N61.0 MASTITIS: Primary | ICD-10-CM

## 2025-08-08 PROCEDURE — 99214 OFFICE O/P EST MOD 30 MIN: CPT | Performed by: PHYSICIAN ASSISTANT

## 2025-08-08 PROCEDURE — 3078F DIAST BP <80 MM HG: CPT | Performed by: PHYSICIAN ASSISTANT

## 2025-08-08 PROCEDURE — 3074F SYST BP LT 130 MM HG: CPT | Performed by: PHYSICIAN ASSISTANT

## 2025-08-08 RX ORDER — CLOTRIMAZOLE 1 %
CREAM (GRAM) TOPICAL
Qty: 28 G | Refills: 0 | Status: SHIPPED | OUTPATIENT
Start: 2025-08-08

## 2025-08-08 RX ORDER — DICLOXACILLIN SODIUM 250 MG/1
500 CAPSULE ORAL 4 TIMES DAILY
Qty: 40 CAPSULE | Refills: 0 | Status: SHIPPED | OUTPATIENT
Start: 2025-08-08 | End: 2025-08-08

## 2025-08-08 RX ORDER — DICLOXACILLIN SODIUM 250 MG/1
500 CAPSULE ORAL 4 TIMES DAILY
Qty: 40 CAPSULE | Refills: 0 | Status: SHIPPED | OUTPATIENT
Start: 2025-08-08 | End: 2025-08-13

## 2025-08-08 RX ORDER — CLOTRIMAZOLE 1 %
CREAM (GRAM) TOPICAL
Qty: 28 G | Refills: 0 | Status: SHIPPED | OUTPATIENT
Start: 2025-08-08 | End: 2025-08-08

## 2025-08-08 ASSESSMENT — ENCOUNTER SYMPTOMS
EYE REDNESS: 0
EYE DISCHARGE: 0
SHORTNESS OF BREATH: 0
FEVER: 0
WHEEZING: 0
BREAST PAIN: 1